# Patient Record
Sex: FEMALE | Race: WHITE | NOT HISPANIC OR LATINO | Employment: FULL TIME | URBAN - METROPOLITAN AREA
[De-identification: names, ages, dates, MRNs, and addresses within clinical notes are randomized per-mention and may not be internally consistent; named-entity substitution may affect disease eponyms.]

---

## 2017-12-23 ENCOUNTER — TRANSFERRED RECORDS (OUTPATIENT)
Dept: HEALTH INFORMATION MANAGEMENT | Facility: CLINIC | Age: 37
End: 2017-12-23

## 2018-12-20 ENCOUNTER — HOSPITAL ENCOUNTER (EMERGENCY)
Facility: CLINIC | Age: 38
Discharge: HOME | End: 2018-12-20
Attending: PHYSICIAN ASSISTANT | Admitting: PHYSICIAN ASSISTANT
Payer: MEDICARE

## 2018-12-20 ENCOUNTER — APPOINTMENT (OUTPATIENT)
Dept: GENERAL RADIOLOGY | Facility: CLINIC | Age: 38
End: 2018-12-20
Attending: PHYSICIAN ASSISTANT
Payer: MEDICARE

## 2018-12-20 VITALS
SYSTOLIC BLOOD PRESSURE: 162 MMHG | HEIGHT: 64 IN | OXYGEN SATURATION: 98 % | TEMPERATURE: 98.6 F | BODY MASS INDEX: 48.32 KG/M2 | HEART RATE: 71 BPM | DIASTOLIC BLOOD PRESSURE: 91 MMHG | WEIGHT: 283 LBS | RESPIRATION RATE: 18 BRPM

## 2018-12-20 DIAGNOSIS — M25.561 RIGHT KNEE PAIN: ICD-10-CM
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
## 2018-12-20 DIAGNOSIS — M25.532 LEFT WRIST PAIN: ICD-10-CM

## 2018-12-20 PROCEDURE — 29125 APPL SHORT ARM SPLINT STATIC: CPT | Mod: LT | Performed by: PHYSICIAN ASSISTANT

## 2018-12-20 PROCEDURE — 99214 OFFICE O/P EST MOD 30 MIN: CPT | Mod: Z6 | Performed by: PHYSICIAN ASSISTANT

## 2018-12-20 PROCEDURE — 29505 APPLICATION LONG LEG SPLINT: CPT | Mod: RT | Performed by: PHYSICIAN ASSISTANT

## 2018-12-20 PROCEDURE — G0463 HOSPITAL OUTPT CLINIC VISIT: HCPCS | Mod: 25 | Performed by: PHYSICIAN ASSISTANT

## 2018-12-20 PROCEDURE — A9270 NON-COVERED ITEM OR SERVICE: HCPCS | Mod: GY | Performed by: PHYSICIAN ASSISTANT

## 2018-12-20 PROCEDURE — 73560 X-RAY EXAM OF KNEE 1 OR 2: CPT | Mod: 50

## 2018-12-20 PROCEDURE — 25000132 ZZH RX MED GY IP 250 OP 250 PS 637: Performed by: PHYSICIAN ASSISTANT

## 2018-12-20 PROCEDURE — 73110 X-RAY EXAM OF WRIST: CPT | Mod: LT

## 2018-12-20 PROCEDURE — 72100 X-RAY EXAM L-S SPINE 2/3 VWS: CPT

## 2018-12-20 RX ORDER — METHOCARBAMOL 500 MG/1
500 TABLET, FILM COATED ORAL 4 TIMES DAILY PRN
Qty: 20 TABLET | Refills: 0 | Status: SHIPPED | OUTPATIENT
Start: 2018-12-20 | End: 2020-12-01

## 2018-12-20 RX ORDER — OXYCODONE AND ACETAMINOPHEN 5; 325 MG/1; MG/1
2 TABLET ORAL ONCE
Status: COMPLETED | OUTPATIENT
Start: 2018-12-20 | End: 2018-12-20

## 2018-12-20 RX ORDER — HYDROCODONE BITARTRATE AND ACETAMINOPHEN 5; 325 MG/1; MG/1
1 TABLET ORAL EVERY 6 HOURS PRN
Qty: 15 TABLET | Refills: 0 | Status: SHIPPED | OUTPATIENT
Start: 2018-12-20 | End: 2018-12-23

## 2018-12-20 RX ADMIN — OXYCODONE HYDROCHLORIDE AND ACETAMINOPHEN 2 TABLET: 5; 325 TABLET ORAL at 19:10

## 2018-12-20 ASSESSMENT — MIFFLIN-ST. JEOR: SCORE: 1934.71

## 2018-12-20 NOTE — ED PROVIDER NOTES
History     Chief Complaint   Patient presents with     Fall     pt slipped on her sidewalk this a.m.  c/o R knee pain and L wrist pain.  no LOC. no blood thinners     HPI  Alma Quinn is a 38 year old female past medical history significant for chronic low back pain, status post surgery with residual lumbar radiculopathy in the left leg, bipolar disorder, fibromyalgia who presents to the urgent care with concerns over several areas of pain after sustaining a fall earlier this morning.  Patient states that she typically uses a cane to ambulate during the winter months as she is a fall risk due to her chronic back pain.  She left the house this morning while wearing slipperst to grab something outside and slipped on the ice landing on to her wrists and knees bilaterally with knees in sight valgus position.  She was able to crawl and eventually arise on her own.  She complains of ecchymosis on her left knee and left wrist, however area of most pain is her right knee.  She states concern that knee feels like it is going to give out on her and she has had difficulty ambulating due to discomfort.  She denies hitting her head during the fall no significant headache, dizziness, lightheadedness, nausea, vomiting, abdominal or chest pain.  She has attempted to treat with ice, OTC medications without relief.      Problem List:    Patient Active Problem List    Diagnosis Date Noted     Bipolar disorder (H) 04/13/2011     Priority: [facility] psychiatric nurse, Margaux Azul, NP with [facility]       GERD (gastroesophageal reflux disease) 10/19/2012     Priority: Medium     Lumbar radiculopathy 11/04/2011     Priority: Medium     Acute bilateral leg weakness 2/11 and found to have L5-S1 herniated disk with compression of left L5 nerve root.    S/p left L5-S1 hemilaminectomy and microdiscectomy 2/24/11.  Continues to have residual left leg weakness and pain       Weakness of left leg 11/04/2011      Priority: Medium     Fibromyalgia 05/23/2011     Priority: Medium     DDD (degenerative disc disease), lumbar 04/13/2011     Priority: Medium     MRI 2/11- L5-S1 large central posterior extruded disc herniation extending inferior to the disc level, causing slight impression on  thecal sac and left S1 nerve root sleeve. S/p Left L5-S1 hemilaminectomy discectomy.        SAMEER (obstructive sleep apnea) 06/17/2010     Priority: Medium     Not tolerating CPAP, has deviated septum - referred to ENT in 12/09 and prescribed flonase       Seasonal allergic rhinitis 06/17/2010     Priority: Medium     Intermittent asthma 06/17/2010     Priority: Medium     Uses inhaled steroid in fall, typically       Chronic pain disorder 06/17/2010     Priority: Medium     Started in 2008 3 months postpartum.  Pain in her entire body with muscle cramping- arms, legs, elbows, knees, ankles, hips, shoulders, fingers etc.  Has been on multiple meds icluding gabapentin, cymbalta, NSAIDs, flexeril and narcotics with minimal benefit.  High doses of prednisone helped some.  Insurance would not cover lyrica.  Mild elevation of ESR to ~30 and CRP to ~24.  RF was 14 (normal 0-14).  CCP was negative  MRI 4/09:  Degenerative changes at L5-S1, mod to mild corby foraminal narrowing with mild facet arthropathy, no nerve impingement  Negative ehrlichia and lyme testing in 5/09  Mildly decreased vitamin D level at 20 in 10/08 - corrected to 25 in 1/09, then put on 50,000 units/week  Vitamin D level 5/10 was 16  ? Fibromyalgia vs chronic pain syndrome vs vitamin D deficiency?  Normal hand and foot xrays 7/09  Seen by Dr. Gildardo Villeda, rheumatology 6/10 - lab w/u and exam negative for RA.  Vitamin D levels low - recommended vitamin D3 4000 units/day x 8 weeks, then 2000 units/day.  Also referral to PT for iliotibial band syndrome and corby knee pain.  Also referral to rheumatology nursing associates for fibromyalgia.  Also emphasized need to treat SAMEER and  possible bariatric surgery.       PCOS (polycystic ovarian syndrome) 2010     Priority: Medium     CARDIOVASCULAR SCREENING; LDL GOAL LESS THAN 160 10/31/2010     Priority: Low      Past Medical History:    Past Medical History:   Diagnosis Date     Cystic kidney disease      Depression      PONV (postoperative nausea and vomiting)      Post traumatic stress disorder      Seasonal allergies      Toxemia      Uncomplicated asthma      Past Surgical History:    Past Surgical History:   Procedure Laterality Date     ADENOIDECTOMY       APPENDECTOMY       ARTHROSCOPY KNEE WITH MEDIAL MENISCECTOMY  2014    Procedure: ARTHROSCOPY KNEE WITH MEDIAL MENISCECTOMY;  Surgeon: Arron Curtis MD;  Location: [facility] OR     ARTHROSCOPY KNEE WITH MEDIAL MENISCECTOMY Left 2015    Procedure: ARTHROSCOPY KNEE WITH MEDIAL MENISCECTOMY;  Surgeon: Arron Curtis MD;  Location: [facility] OR     C/SECTION, CLASSICAL      , Classical     CHOLECYSTECTOMY, LAPOROSCOPIC  10/07    Cholecystectomy, Laparoscopic     HEMILAMINECTOMY, DISCECTOMY LUMBAR TWO LEVELS, COMBINED  11    Left L5-S1 hemilaminectomy for discectomy      LAPAROSCOPIC GASTRIC SLEEVE  12    lap sleeve gastrectomy     SURGICAL HISTORY OF -       Kidney Surgery     SURGICAL HISTORY OF -       Urethral implants     TONSILLECTOMY       Family History:    Family History   Problem Relation Age of Onset     Gastrointestinal Disease Mother         chrohns     Diabetes Father      Cancer Father         had cancer  between lining of lungs     Cardiovascular Father 57        4 valves replaced     Lupus Father      Breast Cancer Maternal Grandmother      Respiratory Maternal Grandfather         COPD     Diabetes Paternal Grandmother      C.A.D. Paternal Grandmother      Asthma Paternal Grandmother      Diabetes Paternal Grandfather      Prostate Cancer Paternal Grandfather      Asthma Son      Asthma Daughter      Social History:  Marital Status:   " [2]  Social History     Tobacco Use     Smoking status: Former Smoker     Packs/day: 0.50     Years: 15.00     Pack years: 7.50     Types: Cigarettes     Last attempt to quit: 2014     Years since quittin.4     Smokeless tobacco: Never Used     Tobacco comment: 1/4 pack daily   Substance Use Topics     Alcohol use: No     Drug use: No     Medications:      albuterol (PROAIR HFA, PROVENTIL HFA, VENTOLIN HFA) 108 (90 BASE) MCG/ACT inhaler   budesonide (PULMICORT) 0.25 MG/2ML nebulizer solution   budesonide (PULMICORT) 1 MG/2ML SUSP nebulizer solution   buPROPion (WELLBUTRIN XL) 150 MG 24 hr tablet   Cholecalciferol (VITAMIN D3) 64690 UNIT CAPS   folic acid (FOLVITE) 1 MG tablet   HYDROXYZINE HCL PO   levonorgestrel (MIRENA) 20 MCG/24HR IUD   metaxalone (SKELAXIN) 800 MG tablet   methotrexate 2.5 MG tablet   methylphenidate (CONCERTA) 27 MG CR tablet   Omega-3 Fatty Acids (OMEGA-3 FISH OIL PO)   omeprazole (PRILOSEC) 20 MG capsule   ondansetron (ZOFRAN) 4 MG tablet   ORDER FOR DME     Review of Systems  CONSTITUTIONAL:NEGATIVE for fever, chills, change in weight  INTEGUMENTARY/SKIN: POSITIVE for ecchymosis, abrasions NEGATIVE for lacerations, worrisome rashes   EYES: NEGATIVE for vision changes or irritation  ENT/MOUTH: NEGATIVE for ear, mouth and throat problems  RESP:NEGATIVE for significant cough or SOB  GI: NEGATIVE for abdominal pain, diarrhea, nausea and vomiting  MUSCULOSKELETAL: POSITIVE  for bilateral knee and wrist pain and low back pain  NEURO: POSITIVE for chronic change in sensation in her left leg and NEGATIVE for headache, dizziness, lightheadedness   Physical Exam   BP: (!) 162/91  Pulse: 71  Temp: 98.6  F (37  C)  Resp: 18  Height: 161.9 cm (5' 3.75\")  Weight: 128.4 kg (283 lb)  SpO2: 98 %  Physical Exam   Constitutional: She appears well-developed and well-nourished. No distress.   Cardiovascular: Normal rate. Exam reveals no gallop and no friction rub.   No murmur " heard.  Pulmonary/Chest: Effort normal. No respiratory distress. She has no wheezes. She has no rales. She exhibits no tenderness.   Musculoskeletal:        Right elbow: Normal.       Left elbow: Normal.        Right wrist: She exhibits tenderness. She exhibits normal range of motion, no bony tenderness, no swelling, no effusion, no crepitus, no deformity and no laceration.        Left wrist: She exhibits tenderness and swelling. She exhibits normal range of motion, no bony tenderness, no effusion, no crepitus, no deformity and no laceration.        Right knee: She exhibits decreased range of motion and swelling. She exhibits no effusion, no deformity, no laceration, no erythema, no LCL laxity and no MCL laxity. Tenderness found. MCL tenderness noted.        Left knee: She exhibits decreased range of motion. She exhibits no swelling, no effusion, no ecchymosis, no laceration, no LCL laxity and no MCL laxity. Tenderness found.        Lumbar back: She exhibits tenderness and pain. She exhibits no bony tenderness, no swelling, no edema, no laceration, no spasm and normal pulse.        Right hand: Normal.        Left hand: Normal.        Right lower leg: Normal.        Left lower leg: Normal.   Neurological: She is alert. She is not disoriented. No sensory deficit.   Sensation to light touch is grossly intact in upper and lower extremities bilaterally   Skin: Skin is warm. Abrasion and ecchymosis (dorsum of left wrist) noted. No rash noted. No erythema.       ED Course        Procedures        Critical Care time:  none        Results for orders placed or performed during the hospital encounter of 12/20/18   XR Wrist Left G/E 3 Views    Narrative    LEFT WRIST THREE OR MORE VIEWS 12/20/2018 5:48 PM     HISTORY: Pain after fall.    COMPARISON: None.      Impression    IMPRESSION: There is slight irregularity of the radial metaphysis  along the radial/lateral aspect. There is slight irregularity of the  ulnar styloid  process. These findings probably represent sequelae of  old injury; however, acute fracture cannot be completely excluded.  Consider follow-up x-rays. Carpal bones are unremarkable.    SHAY GALLAGHER MD   Lumbar spine XR, 2-3 views    Narrative    LUMBAR SPINE TWO TO THREE VIEWS   12/20/2018 5:48 PM     HISTORY: Pain after fall today, prior history of fusion.    COMPARISON: X-ray 2/10/2015    FINDINGS: Postoperative changes of L5-S1 posterior  fusion/decompression are demonstrated. No evidence of hardware  loosening. There is slight anterolisthesis of L5 on S1 (3 to 4 mm),  unchanged. No evidence of acute fracture or traumatic subluxation.  Vertebral body heights are maintained. Multilevel mild disc height  loss is present. Cholecystectomy clips noted. Intrauterine device is  present within the midline pelvis.      Impression    IMPRESSION: Stable alignment. No evidence of fracture.    SHAY GALLAGHER MD   XR Knee Bilateral 1/2 Views    Narrative    BILATERAL KNEES ONE TO TWO VIEWS  12/20/2018 5:47 PM     HISTORY: Pain after fall.    COMPARISON: 4/23/2014 left knee      Impression    IMPRESSION: No fracture or dislocation is identified. There is mild  medial compartment joint space narrowing, left worse than right. No  evidence of effusion.    SHAY GALLAGHER MD     Medications - No data to display     Assessments & Plan (with Medical Decision Making)     I have reviewed the nursing notes.    I have reviewed the findings, diagnosis, plan and need for follow up with the patient.          Medication List      Started    HYDROcodone-acetaminophen 5-325 MG tablet  Commonly known as:  NORCO  1 tablet, Oral, EVERY 6 HOURS PRN     methocarbamol 500 MG tablet  Commonly known as:  ROBAXIN  500 mg, Oral, 4 TIMES DAILY PRN            Final diagnoses:   Right knee pain   Fall, initial encounter   Left wrist pain     -year-old female with past medical history significant for chronic low back pain with left lumbar radiculopathy  presents to the urgent care with concern over her bilateral knee, wrist pain after sustaining fall on the ice earlier today.  She had stable vital signs upon arrival.  Physical exam findings as described above.  As part of evaluation patient had x-ray of her left wrist, lumbar spine and both knees which were negative for dislocation, some chronic degenerative changes seen and postoperative changes in her back were noted which were unchanged from her prior visits.  There was some concern over possible fracture to ulnar styloid, however age was indoctrinate on X-ray and as patient has less severe pain in this area, I have low suspicion for acute injury,  However shew as placed in Velcro wrist brace for pain.  Differential would include sprains contusion.  Patient's left knee is concerning for medial collateral ligament strain as patient has acute tenderness palpation over this area, difficult to assess for actual ligamentous stability due to patient's degree of pain and cooperation level.  She was placed in knee immobilizer for comfort.  Shew as discharged home stable with small number of Norco to be used as needed for breakthrough pain.  Follow up with her PCP or orthopedist for recheck in the next week.  Worrisome reasons to return to ER/UC sooner discussed.     Disclaimer: This note consists of symbols derived from keyboarding, dictation, and/or voice recognition software. As a result, there may be errors in the script that have gone undetected.  Please consider this when interpreting information found in the chart.      12/20/2018   [facility] EMERGENCY DEPARTMENT     Ally Lam PA-C  12/23/18 [id number]

## 2018-12-20 NOTE — ED AVS SNAPSHOT
[facility] Emergency Department  [address] [address]  Phone:  [phone]  Fax:  [phone]                                    Alma Quinn   MRN: [id number]    Department:  [facility] Emergency Department   Date of Visit:  12/20/2018           After Visit Summary Signature Page    I have received my discharge instructions, and my questions have been answered. I have discussed any challenges I see with this plan with the nurse or doctor.    ..........................................................................................................................................  Patient/Patient Representative Signature      ..........................................................................................................................................  Patient Representative Print Name and Relationship to Patient    ..................................................               ................................................  Date                                   Time    ..........................................................................................................................................  Reviewed by Signature/Title    ...................................................              ..............................................  Date                                               Time          22EPIC Rev 08/18

## 2020-01-24 ENCOUNTER — OFFICE VISIT (OUTPATIENT)
Dept: FAMILY MEDICINE | Facility: CLINIC | Age: 40
End: 2020-01-24
Payer: COMMERCIAL

## 2020-01-24 VITALS
HEART RATE: 70 BPM | BODY MASS INDEX: 48.82 KG/M2 | HEIGHT: 65 IN | TEMPERATURE: 98.7 F | DIASTOLIC BLOOD PRESSURE: 78 MMHG | RESPIRATION RATE: 16 BRPM | SYSTOLIC BLOOD PRESSURE: 112 MMHG | WEIGHT: 293 LBS | OXYGEN SATURATION: 96 %

## 2020-01-24 DIAGNOSIS — E66.01 MORBID OBESITY (H): ICD-10-CM

## 2020-01-24 DIAGNOSIS — J45.20 INTERMITTENT ASTHMA, UNSPECIFIED ASTHMA SEVERITY, UNSPECIFIED WHETHER COMPLICATED: ICD-10-CM

## 2020-01-24 DIAGNOSIS — R41.840 INATTENTION: ICD-10-CM

## 2020-01-24 DIAGNOSIS — F90.0 ADHD (ATTENTION DEFICIT HYPERACTIVITY DISORDER), INATTENTIVE TYPE: ICD-10-CM

## 2020-01-24 DIAGNOSIS — F31.9 BIPOLAR 1 DISORDER (H): ICD-10-CM

## 2020-01-24 DIAGNOSIS — G47.00 INSOMNIA, UNSPECIFIED TYPE: Primary | ICD-10-CM

## 2020-01-24 PROCEDURE — 99204 OFFICE O/P NEW MOD 45 MIN: CPT | Performed by: FAMILY MEDICINE

## 2020-01-24 RX ORDER — HYDROXYZINE HYDROCHLORIDE 50 MG/1
100 TABLET, FILM COATED ORAL
Qty: 180 TABLET | Refills: 4 | Status: SHIPPED | OUTPATIENT
Start: 2020-01-24 | End: 2021-04-08

## 2020-01-24 RX ORDER — ALBUTEROL SULFATE 90 UG/1
2 AEROSOL, METERED RESPIRATORY (INHALATION) EVERY 4 HOURS PRN
Qty: 1 INHALER | Refills: 4 | Status: SHIPPED | OUTPATIENT
Start: 2020-01-24 | End: 2021-08-06

## 2020-01-24 RX ORDER — MULTIPLE VITAMINS W/ MINERALS TAB 9MG-400MCG
1 TAB ORAL DAILY
COMMUNITY

## 2020-01-24 ASSESSMENT — PAIN SCALES - GENERAL: PAINLEVEL: NO PAIN (0)

## 2020-01-24 ASSESSMENT — ANXIETY QUESTIONNAIRES
GAD7 TOTAL SCORE: 9
5. BEING SO RESTLESS THAT IT IS HARD TO SIT STILL: MORE THAN HALF THE DAYS
1. FEELING NERVOUS, ANXIOUS, OR ON EDGE: NEARLY EVERY DAY
7. FEELING AFRAID AS IF SOMETHING AWFUL MIGHT HAPPEN: SEVERAL DAYS
2. NOT BEING ABLE TO STOP OR CONTROL WORRYING: NOT AT ALL
3. WORRYING TOO MUCH ABOUT DIFFERENT THINGS: SEVERAL DAYS
6. BECOMING EASILY ANNOYED OR IRRITABLE: NOT AT ALL

## 2020-01-24 ASSESSMENT — ASTHMA QUESTIONNAIRES
QUESTION_2 LAST FOUR WEEKS HOW OFTEN HAVE YOU HAD SHORTNESS OF BREATH: ONCE OR TWICE A WEEK
QUESTION_3 LAST FOUR WEEKS HOW OFTEN DID YOUR ASTHMA SYMPTOMS (WHEEZING, COUGHING, SHORTNESS OF BREATH, CHEST TIGHTNESS OR PAIN) WAKE YOU UP AT NIGHT OR EARLIER THAN USUAL IN THE MORNING: ONCE OR TWICE
ACT_TOTALSCORE: 20
QUESTION_4 LAST FOUR WEEKS HOW OFTEN HAVE YOU USED YOUR RESCUE INHALER OR NEBULIZER MEDICATION (SUCH AS ALBUTEROL): TWO OR THREE TIMES PER WEEK
QUESTION_1 LAST FOUR WEEKS HOW MUCH OF THE TIME DID YOUR ASTHMA KEEP YOU FROM GETTING AS MUCH DONE AT WORK, SCHOOL OR AT HOME: NONE OF THE TIME
QUESTION_5 LAST FOUR WEEKS HOW WOULD YOU RATE YOUR ASTHMA CONTROL: WELL CONTROLLED

## 2020-01-24 ASSESSMENT — PATIENT HEALTH QUESTIONNAIRE - PHQ9
5. POOR APPETITE OR OVEREATING: MORE THAN HALF THE DAYS
SUM OF ALL RESPONSES TO PHQ QUESTIONS 1-9: 2

## 2020-01-24 ASSESSMENT — MIFFLIN-ST. JEOR: SCORE: 2074.75

## 2020-01-24 NOTE — PROGRESS NOTES
Chief Complaint   Patient presents with     Sleep Problem     rx refill on Hydroxyzine.  Switching care to from [facility] [place].      ADDITIONAL HPI: 39 year old female here for above issue.  Flight phobia. Will likely need lorazepam or similar when she travels. Has used this with success in the past.    ROS: 10 point review of systems negative except as per HPI.    PAST MEDICAL HISTORY:  Past Medical History:   Diagnosis Date     Cystic kidney disease      Depression      PONV (postoperative nausea and vomiting)      Post traumatic stress disorder      Seasonal allergies      Toxemia      Uncomplicated asthma         ACTIVE MEDICAL PROBLEMS:  Patient Active Problem List   Diagnosis     SAMEER (obstructive sleep apnea)     Seasonal allergic rhinitis     Intermittent asthma     Chronic pain disorder     PCOS (polycystic ovarian syndrome)     CARDIOVASCULAR SCREENING; LDL GOAL LESS THAN 160     Bipolar disorder (H)     DDD (degenerative disc disease), lumbar     Fibromyalgia     Lumbar radiculopathy     Weakness of left leg     GERD (gastroesophageal reflux disease)        FAMILY HISTORY:  Family History   Problem Relation Age of Onset     Gastrointestinal Disease Mother         chrohns     Diabetes Father      Cancer Father         had cancer  between lining of lungs     Cardiovascular Father 57        4 valves replaced     Lupus Father      Breast Cancer Maternal Grandmother      Respiratory Maternal Grandfather         COPD     Diabetes Paternal Grandmother      C.A.D. Paternal Grandmother      Asthma Paternal Grandmother      Diabetes Paternal Grandfather      Prostate Cancer Paternal Grandfather      Asthma Son      Asthma Daughter        SOCIAL HISTORY:  Social History     Socioeconomic History     Marital status:      Spouse name: Not on file     Number of children: Not on file     Years of education: Not on file     Highest education level: Not on file   Occupational History      Not on file   Social Needs     Financial resource strain: Not on file     Food insecurity:     Worry: Not on file     Inability: Not on file     Transportation needs:     Medical: Not on file     Non-medical: Not on file   Tobacco Use     Smoking status: Former Smoker     Packs/day: 0.50     Years: 15.00     Pack years: 7.50     Types: Cigarettes     Last attempt to quit: 2014     Years since quittin.5     Smokeless tobacco: Never Used     Tobacco comment: 1/4 pack daily   Substance and Sexual Activity     Alcohol use: No     Drug use: No     Sexual activity: Yes     Partners: Male     Birth control/protection: I.U.D.   Lifestyle     Physical activity:     Days per week: Not on file     Minutes per session: Not on file     Stress: Not on file   Relationships     Social connections:     Talks on phone: Not on file     Gets together: Not on file     Attends [employer] service: Not on file     Active member of club or organization: Not on file     Attends meetings of clubs or organizations: Not on file     Relationship status: Not on file     Intimate partner violence:     Fear of current or ex partner: Not on file     Emotionally abused: Not on file     Physically abused: Not on file     Forced sexual activity: Not on file   Other Topics Concern     Parent/sibling w/ CABG, MI or angioplasty before 65F 55M? Yes   Social History Narrative     Not on file       MEDICATIONS:  Current Outpatient Medications   Medication Sig Dispense Refill     albuterol (PROAIR HFA, PROVENTIL HFA, VENTOLIN HFA) 108 (90 BASE) MCG/ACT inhaler Inhale 2 puffs into the lungs every 4 hours as needed for shortness of breath / dyspnea 1 Inhaler 4     budesonide (PULMICORT) 0.25 MG/2ML nebulizer solution Take 2 mLs (0.25 mg) by nebulization daily For asthma 60 mL 12     budesonide (PULMICORT) 1 MG/2ML SUSP nebulizer solution Take 2 mLs by nebulization 2 times daily. 120 mL 12     buPROPion (WELLBUTRIN XL) 150 MG 24 hr tablet Take 1  tablet (150 mg) by mouth every morning 30 tablet 1     Cholecalciferol (VITAMIN D3) 34072 UNIT CAPS Take 2 capsules by mouth 2 times daily.       folic acid (FOLVITE) 1 MG tablet Take 1 tablet (1 mg) by mouth daily 100 tablet 3     HYDROXYZINE HCL PO Take 10 mg by mouth       methocarbamol (ROBAXIN) 500 MG tablet Take 1 tablet (500 mg) by mouth 4 times daily as needed for muscle spasms 20 tablet 0     multivitamin w/minerals (MULTI-VITAMIN) tablet Take 1 tablet by mouth daily       Omega-3 Fatty Acids (OMEGA-3 FISH OIL PO)        omeprazole (PRILOSEC) 20 MG capsule Take 1 capsule (20 mg) by mouth 2 times daily 180 capsule 3     ondansetron (ZOFRAN) 4 MG tablet Take 1 tablet (4 mg) by mouth every 8 hours as needed for nausea 30 tablet 0     ORDER FOR DME Equipment being ordered: AFO for foot drop 1 each 0     vitamin B complex with vitamin C (VITAMIN  B COMPLEX) tablet Take 1 tablet by mouth daily       levonorgestrel (MIRENA) 20 MCG/24HR IUD 1 each by Intrauterine route once for 1 dose. 1 each 0     metaxalone (SKELAXIN) 800 MG tablet Take 1 tablet (800 mg) by mouth 3 times daily as needed for moderate pain (Patient not taking: Reported on 1/24/2020) 90 tablet 1     methotrexate 2.5 MG tablet Take 6 tablets (15 mg) by mouth once a week (Patient not taking: Reported on 1/24/2020) 30 tablet 1     methylphenidate (CONCERTA) 27 MG CR tablet Take 27 mg by mouth every morning         ALLERGIES:     Allergies   Allergen Reactions     Adhesive Tape Other (See Comments)     Open sores.     Shellfish Allergy      Avocado Rash     Banana Rash     Kiwi, avocado     Latex Rash     Penicillins Rash     Plaquenil [Hydroxychloroquine] Rash     Sulfanilamide Rash       Problem list, Medication list, Allergies, and Medical/Social/Surgical histories reviewed in EPIC and updated as appropriate.    OBJECTIVE:                                                    VITALS: /78 (BP Location: Right arm, Cuff Size: Adult Large)   Pulse 70  "  Temp 98.7  F (37.1  C) (Tympanic)   Resp 16   Ht 1.651 m (5' 5\")   Wt 140.9 kg (310 lb 9.6 oz)   SpO2 96%   Breastfeeding No   BMI 51.69 kg/m   Body mass index is 51.69 kg/m .  GENERAL: Pleasant, well appearing female.  HEENT: PERRL, EOMI, oropharynx clear.  NECK: supple, no thyromegaly or thyroid masses, no lymphadenopathy.  CV: RRR, no murmurs, rubs or gallops.  LUNGS: Clear to auscultation bilaterally, normal effort.  ABDOMEN: Soft, non-distended, non-tender.  No hepatosplenomegaly or palpable masses.    SKIN: warm and dry without obvious rashes.   EXTREMITIES: No edema, normal pulses.   PSYCH: Alert and oriented x3, neatly dressed and well groomed, makes good eye contact, fluid speech - non-pressured, no psychomotor agitation or slowing, good memory, judgement and insight, no auditory or visual hallucinations, bright affect which is mood congruent. No suicidal ideation.     ASSESSMENT/PLAN:                                                    1. Insomnia, unspecified type  Well controlled. Refilled medication.     - hydrOXYzine (ATARAX) 50 MG tablet; Take 2 tablets (100 mg) by mouth nightly as needed for itching  Dispense: 180 tablet; Refill: 4    2. Morbid obesity (H)  Working on lifestyle changes.     3. ADHD (attention deficit hyperactivity disorder), inattentive type  Given age I would recommend formal assessment through psychologist.   Self reported concerta but no rx for this documented anywhere.    4. Inattention  - MENTAL HEALTH REFERRAL  - Adult; Assessments and Testing; ADHD; [facility]: [facility] [phone]; We will contact you to schedule the appointment or please call with any questions    5. Intermittent asthma, unspecified asthma severity, unspecified whether complicated  Stable. Refilled medication.    - albuterol (PROAIR HFA/PROVENTIL HFA/VENTOLIN HFA) 108 (90 Base) MCG/ACT inhaler; Inhale 2 puffs into the lungs every 4 hours as needed for shortness of breath / dyspnea  " Dispense: 1 Inhaler; Refill: 4    6. Bipolar I  Sees psychiatric nurse, Margaux Azul NP with [facility]

## 2020-01-24 NOTE — PATIENT INSTRUCTIONS
Our Clinic hours are:  Mondays    7:20 am - 7 pm  Tues -  Fri  7:20 am - 5 pm    Clinic Phone: [phone]    The clinic lab opens at 7:30 am Mon - Fri and appointments are required.    [facility]. [phone]  Monday  8 am - 7pm  Tues - Fri 8 am - 5:30 pm

## 2020-01-25 ASSESSMENT — ASTHMA QUESTIONNAIRES: ACT_TOTALSCORE: 20

## 2020-01-25 ASSESSMENT — ANXIETY QUESTIONNAIRES: GAD7 TOTAL SCORE: 9

## 2020-03-06 ENCOUNTER — OFFICE VISIT (OUTPATIENT)
Dept: FAMILY MEDICINE | Facility: CLINIC | Age: 40
End: 2020-03-06
Payer: COMMERCIAL

## 2020-03-06 VITALS
DIASTOLIC BLOOD PRESSURE: 78 MMHG | BODY MASS INDEX: 48.82 KG/M2 | SYSTOLIC BLOOD PRESSURE: 114 MMHG | TEMPERATURE: 97.7 F | OXYGEN SATURATION: 98 % | WEIGHT: 293 LBS | HEART RATE: 74 BPM | RESPIRATION RATE: 18 BRPM | HEIGHT: 65 IN

## 2020-03-06 DIAGNOSIS — Z91.013 SHELLFISH ALLERGY: Primary | ICD-10-CM

## 2020-03-06 DIAGNOSIS — F40.243 FEAR OF FLYING: ICD-10-CM

## 2020-03-06 PROCEDURE — 99213 OFFICE O/P EST LOW 20 MIN: CPT | Performed by: FAMILY MEDICINE

## 2020-03-06 RX ORDER — LORAZEPAM 0.5 MG/1
.5-1 TABLET ORAL EVERY 6 HOURS PRN
Qty: 4 TABLET | Refills: 0 | Status: SHIPPED | OUTPATIENT
Start: 2020-03-06 | End: 2022-07-01

## 2020-03-06 RX ORDER — EPINEPHRINE 0.3 MG/.3ML
0.3 INJECTION SUBCUTANEOUS PRN
Qty: 2 EACH | Refills: 0 | Status: SHIPPED | OUTPATIENT
Start: 2020-03-06

## 2020-03-06 ASSESSMENT — MIFFLIN-ST. JEOR: SCORE: 2089.6

## 2020-03-06 ASSESSMENT — PAIN SCALES - GENERAL: PAINLEVEL: MODERATE PAIN (5)

## 2020-03-06 NOTE — PROGRESS NOTES
"Subjective     Alma Quinn is a 39 year old female who presents to clinic today for the following health issues:    HPI   Chief Complaint   Patient presents with     Medication Request     Patient needs epi-pen for bees and shellfish     Medication Request     Patient is going to [place] and will need medication for flying.      Going to [facility] tomorrow.  Has a shellfish and bee allergy.  Needs an epinephrine pen in her name (has some from her dad but was told she had to have a prescription with HER name on it for travel).     Also gets very anxious about flying.  Has some coping skills, payton, etc to help but would like something she can take for the flight down and back to help with her panic about flying.               Reviewed and updated as needed this visit by Provider         Review of Systems   ROS COMP: Constitutional, HEENT, cardiovascular, pulmonary, gi and gu systems are negative, except as otherwise noted.      Objective    /78   Pulse 74   Temp 97.7  F (36.5  C) (Tympanic)   Resp 18   Ht 1.657 m (5' 5.25\")   Wt 142 kg (313 lb)   SpO2 98%   BMI 51.69 kg/m    Body mass index is 51.69 kg/m .  Physical Exam   GENERAL: healthy, alert and no distress  NECK: no adenopathy, no asymmetry, masses, or scars and thyroid normal to palpation  RESP: lungs clear to auscultation - no rales, rhonchi or wheezes  CV: regular rate and rhythm, normal S1 S2, no S3 or S4, no murmur, click or rub, no peripheral edema and peripheral pulses strong  ABDOMEN: soft, nontender, no hepatosplenomegaly, no masses and bowel sounds normal  MS: no gross musculoskeletal defects noted, no edema            Assessment & Plan     1. Shellfish allergy     - EPINEPHrine (ANY BX GENERIC EQUIV) 0.3 MG/0.3ML injection 2-pack; Inject 0.3 mLs (0.3 mg) into the muscle as needed for anaphylaxis  Dispense: 2 each; Refill: 0    2. Fear of flying     - LORazepam (ATIVAN) 0.5 MG tablet; Take 1-2 tablets (0.5-1 mg) by mouth every 6 hours " "as needed for anxiety  Dispense: 4 tablet; Refill: 0     BMI:   Estimated body mass index is 51.69 kg/m  as calculated from the following:    Height as of this encounter: 1.657 m (5' 5.25\").    Weight as of this encounter: 142 kg (313 lb).               No follow-ups on file.    Tomeka Leger MD  [facility]      "

## 2020-03-06 NOTE — PATIENT INSTRUCTIONS
Our Clinic hours are:  Mondays    7:20 am - 7 pm  Tues -  Fri  7:20 am - 5 pm    Clinic Phone: [phone]    The clinic lab opens at 7:30 am Mon - Fri and appointments are required.    [facility]. [phone]  Monday  8 am - 7pm  Tues - Fri 8 am - 5:30 pm

## 2020-03-07 ASSESSMENT — ASTHMA QUESTIONNAIRES: ACT_TOTALSCORE: 25

## 2020-03-15 ENCOUNTER — HOSPITAL ENCOUNTER (EMERGENCY)
Facility: CLINIC | Age: 40
Discharge: HOME | End: 2020-03-15
Attending: FAMILY MEDICINE | Admitting: FAMILY MEDICINE
Payer: COMMERCIAL

## 2020-03-15 VITALS
OXYGEN SATURATION: 97 % | WEIGHT: 293 LBS | HEIGHT: 65 IN | RESPIRATION RATE: 10 BRPM | HEART RATE: 60 BPM | TEMPERATURE: 98.3 F | BODY MASS INDEX: 48.82 KG/M2 | SYSTOLIC BLOOD PRESSURE: 140 MMHG | DIASTOLIC BLOOD PRESSURE: 85 MMHG

## 2020-03-15 DIAGNOSIS — F41.9 ANXIETY: ICD-10-CM

## 2020-03-15 DIAGNOSIS — R07.89 OTHER CHEST PAIN: ICD-10-CM

## 2020-03-15 DIAGNOSIS — K21.00 GASTROESOPHAGEAL REFLUX DISEASE WITH ESOPHAGITIS: ICD-10-CM

## 2020-03-15 LAB
ALBUMIN SERPL-MCNC: 3.4 G/DL (ref 3.4–5)
ALP SERPL-CCNC: 73 U/L (ref 40–150)
ALT SERPL W P-5'-P-CCNC: 27 U/L (ref 0–50)
ANION GAP SERPL CALCULATED.3IONS-SCNC: 6 MMOL/L (ref 3–14)
AST SERPL W P-5'-P-CCNC: 19 U/L (ref 0–45)
BASOPHILS # BLD AUTO: 0.1 10E9/L (ref 0–0.2)
BASOPHILS NFR BLD AUTO: 0.7 %
BILIRUB SERPL-MCNC: 0.3 MG/DL (ref 0.2–1.3)
BUN SERPL-MCNC: 15 MG/DL (ref 7–30)
CALCIUM SERPL-MCNC: 9.1 MG/DL (ref 8.5–10.1)
CHLORIDE SERPL-SCNC: 111 MMOL/L (ref 94–109)
CO2 SERPL-SCNC: 26 MMOL/L (ref 20–32)
CREAT SERPL-MCNC: 0.87 MG/DL (ref 0.52–1.04)
DIFFERENTIAL METHOD BLD: NORMAL
EOSINOPHIL # BLD AUTO: 0.5 10E9/L (ref 0–0.7)
EOSINOPHIL NFR BLD AUTO: 7.3 %
ERYTHROCYTE [DISTWIDTH] IN BLOOD BY AUTOMATED COUNT: 13.2 % (ref 10–15)
GFR SERPL CREATININE-BSD FRML MDRD: 82 ML/MIN/{1.73_M2}
GLUCOSE SERPL-MCNC: 90 MG/DL (ref 70–99)
HCT VFR BLD AUTO: 40 % (ref 35–47)
HGB BLD-MCNC: 13 G/DL (ref 11.7–15.7)
IMM GRANULOCYTES # BLD: 0 10E9/L (ref 0–0.4)
IMM GRANULOCYTES NFR BLD: 0.3 %
LYMPHOCYTES # BLD AUTO: 1.9 10E9/L (ref 0.8–5.3)
LYMPHOCYTES NFR BLD AUTO: 27.8 %
MCH RBC QN AUTO: 28 PG (ref 26.5–33)
MCHC RBC AUTO-ENTMCNC: 32.5 G/DL (ref 31.5–36.5)
MCV RBC AUTO: 86 FL (ref 78–100)
MONOCYTES # BLD AUTO: 0.7 10E9/L (ref 0–1.3)
MONOCYTES NFR BLD AUTO: 9.7 %
NEUTROPHILS # BLD AUTO: 3.8 10E9/L (ref 1.6–8.3)
NEUTROPHILS NFR BLD AUTO: 54.2 %
NRBC # BLD AUTO: 0 10*3/UL
NRBC BLD AUTO-RTO: 0 /100
PLATELET # BLD AUTO: 248 10E9/L (ref 150–450)
POTASSIUM SERPL-SCNC: 4.2 MMOL/L (ref 3.4–5.3)
PROT SERPL-MCNC: 6.7 G/DL (ref 6.8–8.8)
RBC # BLD AUTO: 4.65 10E12/L (ref 3.8–5.2)
SODIUM SERPL-SCNC: 143 MMOL/L (ref 133–144)
TROPONIN I SERPL-MCNC: <0.015 UG/L (ref 0–0.04)
WBC # BLD AUTO: 7 10E9/L (ref 4–11)

## 2020-03-15 PROCEDURE — 93010 ELECTROCARDIOGRAM REPORT: CPT | Mod: Z6 | Performed by: FAMILY MEDICINE

## 2020-03-15 PROCEDURE — 99284 EMERGENCY DEPT VISIT MOD MDM: CPT | Mod: 25 | Performed by: FAMILY MEDICINE

## 2020-03-15 PROCEDURE — 80053 COMPREHEN METABOLIC PANEL: CPT | Performed by: FAMILY MEDICINE

## 2020-03-15 PROCEDURE — 99291 CRITICAL CARE FIRST HOUR: CPT | Mod: 25

## 2020-03-15 PROCEDURE — 85025 COMPLETE CBC W/AUTO DIFF WBC: CPT | Performed by: FAMILY MEDICINE

## 2020-03-15 PROCEDURE — 84484 ASSAY OF TROPONIN QUANT: CPT | Performed by: FAMILY MEDICINE

## 2020-03-15 PROCEDURE — 93005 ELECTROCARDIOGRAM TRACING: CPT

## 2020-03-15 RX ORDER — SUCRALFATE 1 G/1
1 TABLET ORAL
Qty: 40 TABLET | Refills: 1 | Status: SHIPPED | OUTPATIENT
Start: 2020-03-15 | End: 2021-03-15

## 2020-03-15 ASSESSMENT — ENCOUNTER SYMPTOMS
CHILLS: 0
FEVER: 0
SHORTNESS OF BREATH: 1
MUSCULOSKELETAL NEGATIVE: 1
DIZZINESS: 0
PALPITATIONS: 0
SORE THROAT: 1
COUGH: 0
HEMATOLOGIC/LYMPHATIC NEGATIVE: 1
VOMITING: 0
WEAKNESS: 0
ABDOMINAL PAIN: 0
EYES NEGATIVE: 1

## 2020-03-15 ASSESSMENT — MIFFLIN-ST. JEOR: SCORE: 2026.67

## 2020-03-15 NOTE — DISCHARGE INSTRUCTIONS
Continue your present medication.    Take the prescribed antacid as directed.    Have follow-up with your primary doctor.  Consider additional GI work-up.    For worsening chest pain, reflux, breathing problems, fever or other concerning symptoms, return for reevaluation.

## 2020-03-15 NOTE — ED TRIAGE NOTES
substernal chest pain that radiates to the left jaw that started in the night, this pain is accompanied with shortness of breath

## 2020-03-15 NOTE — ED AVS SNAPSHOT
[facility] Emergency Department  [address] [address]  Phone:  [phone]  Fax:  [phone]                                    Alma Quinn   MRN: [id number]    Department:  [facility] Emergency Department   Date of Visit:  3/15/2020           After Visit Summary Signature Page    I have received my discharge instructions, and my questions have been answered. I have discussed any challenges I see with this plan with the nurse or doctor.    ..........................................................................................................................................  Patient/Patient Representative Signature      ..........................................................................................................................................  Patient Representative Print Name and Relationship to Patient    ..................................................               ................................................  Date                                   Time    ..........................................................................................................................................  Reviewed by Signature/Title    ...................................................              ..............................................  Date                                               Time          22EPIC Rev 08/18

## 2020-03-15 NOTE — ED NOTES
Pt presents to ED with complaints of central chest pain that started in the night last night. Laying on her right side makes the pain worse, belching makes the pain better. Pt describes the pain as sharp. Pain radiates to the left  Jaw. Pt notes she has some shortness of breath with this as well, worse when the pain hits. Pt has history of asthma and allergies. Pt states she and her  were recently down in [place] ([facility] On this trip pt had a flare up of allergies. Pt has ongoing runny nose from her allergies. Pt believes the pain to be reflux related, states she has a history of this, but her  was concerned because it radiated to her jaw.

## 2020-03-15 NOTE — ED PROVIDER NOTES
History     Chief Complaint   Patient presents with     Chest Pain     substernal chest pain that radiates to the left jaw that started in the night, this pain is accompanied with shortness of breath     HPI  Alma Quinn is a 39 year old female who presents with chest pain and S OB.      This patient returned on a flight from [place] 1 day ago and that evening developed symptoms of chest pain.  Because of the travel, she was initially placed in isolation but she does not have a fever or significant cough so she does not profile out as high risk for Covid-19.    Her history indicates that she has had difficulties following gastric sleeve surgery in 2011.  She has frequent into ingestion and marked reflux.  She feels that the trip to [place] may have aggravated these symptoms and also she was anxious while flying and with current times and it may have triggered off her underlying panic disorder.    The patient had a somewhat positional chest pain last night, worse when she was lying on her right side.  She had difficulty sleeping due to this.  She describes sharp chest pain intermittently which radiates toward her back.  Usually relieved by belching or taking Gas-X or by drinking milk or water..  These did not help much by today.  Therefore she was having intermittent sharp chest pain sometimes radiating to neck.  Her  was concerned and urged her to come in to be evaluated.    The patient does not have significant risks for ischemic cardiac disease.  She is not a diabetic.  Does not have hypertension or hyperlipidemia.  She does not smoke cigarettes.  Her family history is not strongly positive for CAD.    Allergies:  Allergies   Allergen Reactions     Adhesive Tape Other (See Comments)     Open sores.     Shellfish Allergy      Avocado Rash     Banana Rash     Kiwi, avocado     Latex Rash     Penicillins Rash     Plaquenil [Hydroxychloroquine] Rash     Sulfanilamide Rash       Problem List:    Patient  Active Problem List    Diagnosis Date Noted     Bipolar 1 disorder (H) 04/13/2011     Priority: High     Sees psychiatric nurse, Margaux Azul NP with [facility]       Morbid obesity (H) 01/24/2020     Priority: Medium     GERD (gastroesophageal reflux disease) 10/19/2012     Priority: Medium     S/P gastrectomy 07/11/2012     Priority: Medium     Overview:   Lap Sleeve Gastrectomy by Dr. Ephraim Blood from bariatric surgery. DOS 7/11/2012 at [facility] [place], [place]       Lumbar radiculopathy 11/04/2011     Priority: Medium     Acute bilateral leg weakness 2/11 and found to have L5-S1 herniated disk with compression of left L5 nerve root.    S/p left L5-S1 hemilaminectomy and microdiscectomy 2/24/11.  Continues to have residual left leg weakness and pain       Weakness of left leg 11/04/2011     Priority: Medium     Fibromyalgia 05/23/2011     Priority: Medium     DDD (degenerative disc disease), lumbar 04/13/2011     Priority: Medium     MRI 2/11- L5-S1 large central posterior extruded disc herniation extending inferior to the disc level, causing slight impression on  thecal sac and left S1 nerve root sleeve. S/p Left L5-S1 hemilaminectomy discectomy.        SAMEER (obstructive sleep apnea) 06/17/2010     Priority: Medium     Not tolerating CPAP, has deviated septum - referred to ENT in 12/09 and prescribed flonase       Seasonal allergic rhinitis 06/17/2010     Priority: Medium     Intermittent asthma 06/17/2010     Priority: Medium     Uses inhaled steroid in fall, typically       Chronic pain disorder 06/17/2010     Priority: Medium     Started in 2008 3 months postpartum.  Pain in her entire body with muscle cramping- arms, legs, elbows, knees, ankles, hips, shoulders, fingers etc.  Has been on multiple meds icluding gabapentin, cymbalta, NSAIDs, flexeril and narcotics with minimal benefit.  High doses of prednisone helped some.  Insurance would not cover lyrica.  Mild elevation  of ESR to ~30 and CRP to ~24.  RF was 14 (normal 0-14).  CCP was negative  MRI 4/09:  Degenerative changes at L5-S1, mod to mild corby foraminal narrowing with mild facet arthropathy, no nerve impingement  Negative ehrlichia and lyme testing in 5/09  Mildly decreased vitamin D level at 20 in 10/08 - corrected to 25 in 1/09, then put on 50,000 units/week  Vitamin D level 5/10 was 16  ? Fibromyalgia vs chronic pain syndrome vs vitamin D deficiency?  Normal hand and foot xrays 7/09  Seen by Dr. Gildardo Villeda, rheumatology 6/10 - lab w/u and exam negative for RA.  Vitamin D levels low - recommended vitamin D3 4000 units/day x 8 weeks, then 2000 units/day.  Also referral to PT for iliotibial band syndrome and corby knee pain.  Also referral to rheumatology nursing associates for fibromyalgia.  Also emphasized need to treat SAMEER and possible bariatric surgery.       PCOS (polycystic ovarian syndrome) 06/17/2010     Priority: Medium     Generalized osteoarthrosis, involving multiple sites 05/07/2010     Priority: Medium     Bipolar I disorder (H) 05/07/2010     Priority: Medium     Status post bariatric surgery 02/04/2009     Priority: Medium     Posttraumatic stress disorder 09/16/2008     Priority: Medium     CARDIOVASCULAR SCREENING; LDL GOAL LESS THAN 160 10/31/2010     Priority: Low        Past Medical History:    Past Medical History:   Diagnosis Date     Cystic kidney disease      Depression      PONV (postoperative nausea and vomiting)      Post traumatic stress disorder      Seasonal allergies      Toxemia      Uncomplicated asthma        Past Surgical History:    Past Surgical History:   Procedure Laterality Date     ADENOIDECTOMY       APPENDECTOMY       ARTHROSCOPY KNEE WITH MEDIAL MENISCECTOMY  5/29/2014    Procedure: ARTHROSCOPY KNEE WITH MEDIAL MENISCECTOMY;  Surgeon: Arron Curtis MD;  Location: [facility] OR     ARTHROSCOPY KNEE WITH MEDIAL MENISCECTOMY Left 6/11/2015    Procedure: ARTHROSCOPY KNEE WITH MEDIAL  MENISCECTOMY;  Surgeon: Arron Curtis MD;  Location: [facility] OR     C/SECTION, CLASSICAL      , Classical     CHOLECYSTECTOMY, LAPOROSCOPIC  10/07    Cholecystectomy, Laparoscopic     HEMILAMINECTOMY, DISCECTOMY LUMBAR TWO LEVELS, COMBINED  11    Left L5-S1 hemilaminectomy for discectomy      LAPAROSCOPIC GASTRIC SLEEVE  12    lap sleeve gastrectomy     SURGICAL HISTORY OF 1980    Kidney Surgery     SURGICAL HISTORY OF     Urethral implants     TONSILLECTOMY         Family History:    Family History   Problem Relation Age of Onset     Gastrointestinal Disease Mother         chrohns     Diabetes Father      Cancer Father         had cancer  between lining of lungs     Cardiovascular Father 57        4 valves replaced     Lupus Father      Breast Cancer Maternal Grandmother      Respiratory Maternal Grandfather         COPD     Diabetes Paternal Grandmother      C.A.D. Paternal Grandmother      Asthma Paternal Grandmother      Diabetes Paternal Grandfather      Prostate Cancer Paternal Grandfather      Asthma Son      Asthma Daughter        Social History:  Marital Status:   [2]  Social History     Tobacco Use     Smoking status: Former Smoker     Packs/day: 0.50     Years: 15.00     Pack years: 7.50     Types: Cigarettes     Last attempt to quit: 2014     Years since quittin.6     Smokeless tobacco: Never Used     Tobacco comment: 1/4 pack daily   Substance Use Topics     Alcohol use: No     Drug use: No        Medications:    sucralfate (CARAFATE) 1 GM tablet  albuterol (PROAIR HFA/PROVENTIL HFA/VENTOLIN HFA) 108 (90 Base) MCG/ACT inhaler  budesonide (PULMICORT) 0.25 MG/2ML nebulizer solution  buPROPion (WELLBUTRIN XL) 150 MG 24 hr tablet  Cholecalciferol (VITAMIN D3) 49668 UNIT CAPS  EPINEPHrine (ANY BX GENERIC EQUIV) 0.3 MG/0.3ML injection 2-pack  folic acid (FOLVITE) 1 MG tablet  hydrOXYzine (ATARAX) 50 MG tablet  levonorgestrel (MIRENA) 20 MCG/24HR IUD  LORazepam  "(ATIVAN) 0.5 MG tablet  methocarbamol (ROBAXIN) 500 MG tablet  multivitamin w/minerals (MULTI-VITAMIN) tablet  Omega-3 Fatty Acids (OMEGA-3 FISH OIL PO)  omeprazole (PRILOSEC) 20 MG capsule  ondansetron (ZOFRAN) 4 MG tablet  ORDER FOR DME  vitamin B complex with vitamin C (VITAMIN  B COMPLEX) tablet          Review of Systems   Constitutional: Negative for chills and fever.   HENT: Positive for sore throat. Negative for congestion.    Eyes: Negative.    Respiratory: Positive for shortness of breath. Negative for cough.    Cardiovascular: Positive for chest pain. Negative for palpitations.   Gastrointestinal: Negative for abdominal pain and vomiting.   Genitourinary: Negative.    Musculoskeletal: Negative.    Skin: Negative.    Neurological: Negative for dizziness and weakness.   Hematological: Negative.    Psychiatric/Behavioral:        Anxiety.       Physical Exam   BP: 126/89  Pulse: 69  Heart Rate: 70  Temp: 98.3  F (36.8  C)  Resp: 16  Height: 165.1 cm (5' 5\")  Weight: 136.1 kg (300 lb)  SpO2: 95 %      Physical Exam  Constitutional:       Comments: Large woman, NAD.   HENT:      Head: Normocephalic and atraumatic.   Eyes:      Pupils: Pupils are equal, round, and reactive to light.   Neck:      Thyroid: No thyromegaly.   Cardiovascular:      Rate and Rhythm: Normal rate and regular rhythm.      Heart sounds: No murmur. No friction rub.   Pulmonary:      Breath sounds: Normal breath sounds.   Chest:      Chest wall: No tenderness.   Abdominal:      Tenderness: There is no abdominal tenderness.   Musculoskeletal:      Right lower leg: She exhibits no tenderness. No edema.      Left lower leg: She exhibits no tenderness. Edema present.   Skin:     General: Skin is warm and dry.   Neurological:      General: No focal deficit present.   Psychiatric:         Mood and Affect: Mood is anxious.         ED Course        Procedures    EKG:  RSR, rate 70.  Axis nl, conduction nl.  ST segments and T waves nl.  No " significant Q. No ectopy.  EKG is WNL.   Stable since 10-20-14.                   Critical Care time:  none               Results for orders placed or performed during the hospital encounter of 03/15/20 (from the past 24 hour(s))   CBC with platelets differential   Result Value Ref Range    WBC 7.0 4.0 - 11.0 10e9/L    RBC Count 4.65 3.8 - 5.2 10e12/L    Hemoglobin 13.0 11.7 - 15.7 g/dL    Hematocrit 40.0 35.0 - 47.0 %    MCV 86 78 - 100 fl    MCH 28.0 26.5 - 33.0 pg    MCHC 32.5 31.5 - 36.5 g/dL    RDW 13.2 10.0 - 15.0 %    Platelet Count 248 150 - 450 10e9/L    Diff Method Automated Method     % Neutrophils 54.2 %    % Lymphocytes 27.8 %    % Monocytes 9.7 %    % Eosinophils 7.3 %    % Basophils 0.7 %    % Immature Granulocytes 0.3 %    Nucleated RBCs 0 0 /100    Absolute Neutrophil 3.8 1.6 - 8.3 10e9/L    Absolute Lymphocytes 1.9 0.8 - 5.3 10e9/L    Absolute Monocytes 0.7 0.0 - 1.3 10e9/L    Absolute Eosinophils 0.5 0.0 - 0.7 10e9/L    Absolute Basophils 0.1 0.0 - 0.2 10e9/L    Abs Immature Granulocytes 0.0 0 - 0.4 10e9/L    Absolute Nucleated RBC 0.0    Comprehensive metabolic panel   Result Value Ref Range    Sodium 143 133 - 144 mmol/L    Potassium 4.2 3.4 - 5.3 mmol/L    Chloride 111 (H) 94 - 109 mmol/L    Carbon Dioxide 26 20 - 32 mmol/L    Anion Gap 6 3 - 14 mmol/L    Glucose 90 70 - 99 mg/dL    Urea Nitrogen 15 7 - 30 mg/dL    Creatinine 0.87 0.52 - 1.04 mg/dL    GFR Estimate 82 >60 mL/min/[1.73_m2]    GFR Estimate If Black >90 >60 mL/min/[1.73_m2]    Calcium 9.1 8.5 - 10.1 mg/dL    Bilirubin Total 0.3 0.2 - 1.3 mg/dL    Albumin 3.4 3.4 - 5.0 g/dL    Protein Total 6.7 (L) 6.8 - 8.8 g/dL    Alkaline Phosphatase 73 40 - 150 U/L    ALT 27 0 - 50 U/L    AST 19 0 - 45 U/L   Troponin I   Result Value Ref Range    Troponin I ES <0.015 0.000 - 0.045 ug/L       Medications - No data to display    Assessments & Plan (with Medical Decision Making)     Please see the H&P for details.  This patient presented with chest  pain and the primary initial concern was to rule out heart disease.  She had traveled from [place].  She was having exacerbation of GERD symptoms.  Previous gastric surgeries noted.  She additionally had some anxiety.  She did not have significant risk factors for cardiovascular disease.  Lab work-up showed normal CBC and chemistries.  Troponin level was normal.  Electrocardiogram was normal.  Findings were discussed with patient and her .  She is already on omeprazole.  Carafate was added.  She was asked to have follow-up with primary care but also to consider additional GI work-up as her stomach seems to be bothering her quite a bit.  She is agreeable with these plans.            I have reviewed the nursing notes.    I have reviewed the findings, diagnosis, plan and need for follow up with the patient.       Discharge Medication List as of 3/15/2020 11:57 AM      START taking these medications    Details   sucralfate (CARAFATE) 1 GM tablet Take 1 tablet (1 g) by mouth 4 times daily (before meals and nightly) . Crush in small amount of water., Disp-40 tablet,R-1, E-Prescribe             Final diagnoses:   Other chest pain   Gastroesophageal reflux disease with esophagitis   Anxiety       3/15/2020   [facility] EMERGENCY DEPARTMENT     Perez Tineo MD  03/15/20 [id number]

## 2020-12-01 ENCOUNTER — VIRTUAL VISIT (OUTPATIENT)
Dept: FAMILY MEDICINE | Facility: CLINIC | Age: 40
End: 2020-12-01
Payer: COMMERCIAL

## 2020-12-01 DIAGNOSIS — M62.830 BACK MUSCLE SPASM: ICD-10-CM

## 2020-12-01 DIAGNOSIS — M19.029 INFLAMMATION OF ELBOW JOINT: Primary | ICD-10-CM

## 2020-12-01 DIAGNOSIS — M51.369 DDD (DEGENERATIVE DISC DISEASE), LUMBAR: ICD-10-CM

## 2020-12-01 PROCEDURE — 99214 OFFICE O/P EST MOD 30 MIN: CPT | Mod: 95 | Performed by: NURSE PRACTITIONER

## 2020-12-01 RX ORDER — PREDNISONE 20 MG/1
40 TABLET ORAL DAILY
Qty: 10 TABLET | Refills: 0 | Status: SHIPPED | OUTPATIENT
Start: 2020-12-01 | End: 2020-12-06

## 2020-12-01 RX ORDER — METHOCARBAMOL 500 MG/1
500 TABLET, FILM COATED ORAL 4 TIMES DAILY PRN
Qty: 20 TABLET | Refills: 0 | Status: SHIPPED | OUTPATIENT
Start: 2020-12-01 | End: 2021-08-06

## 2020-12-01 ASSESSMENT — ENCOUNTER SYMPTOMS
CONSTITUTIONAL NEGATIVE: 1
BACK PAIN: 1

## 2020-12-01 NOTE — PROGRESS NOTES
"Alma Quinn is a 40 year old female who is being evaluated via a billable telephone visit.      The patient has been notified of following:     \"This telephone visit will be conducted via a call between you and your physician/provider. We have found that certain health care needs can be provided without the need for a physical exam.  This service lets us provide the care you need with a short phone conversation.  If a prescription is necessary we can send it directly to your pharmacy.  If lab work is needed we can place an order for that and you can then stop by our lab to have the test done at a later time.    Telephone visits are billed at different rates depending on your insurance coverage. During this emergency period, for some insurers they may be billed the same as an in-person visit.  Please reach out to your insurance provider with any questions.    If during the course of the call the physician/provider feels a telephone visit is not appropriate, you will not be charged for this service.\"    Patient has given verbal consent for Telephone visit?  Yes    What phone number would you like to be contacted at? [phone]    How would you like to obtain your AVS? Pt. Doesn't need one.     Subjective     Alma Quinn is a 40 year old female who presents via phone visit today for the following health issues:    HPI     Chronic Pain Follow-Up    Where in your body do you have pain? Bilateral elbows left worse than right , hands   How has your pain affected your ability to work? Can work part time with limitations  Which of these pain treatments have you tried since your last clinic visit? Relaxation techniques / Biofeedback, Stretching and Other: banding, gives herself breaks chiropractor, massage and tens unit.   How well are you sleeping? Fair  How has your mood been since your last visit? About the same  Have you had a significant life event? No  Other aggravating factors: repetitive activities - typing at " work  Taking medication as directed? Yes    PHQ-9 SCORE 6/9/2016 1/24/2020   PHQ-9 Total Score 9 2     YO-7 SCORE 6/9/2016 1/24/2020   Total Score 12 9     No flowsheet data found.  Encounter-Level CSA:    There are no encounter-level csa.     Patient-Level CSA:    There are no patient-level csa.         How many servings of fruits and vegetables do you eat daily?  4 or more    On average, how many sweetened beverages do you drink each day (Examples: soda, juice, sweet tea, etc.  Do NOT count diet or artificially sweetened beverages)?   1    How many days per week do you exercise enough to make your heart beat faster? Very active at work    How many minutes a day do you exercise enough to make your heart beat faster? none    How many days per week do you miss taking your medication? 0    Additional provider notes: Left elbow pain worsening over the past 2 weeks. Left elbow pain also locking up at times. Left hand is weaker and is now dropping things. Finger movement is causing more elbow pain. Now with right elbow pain. Left elbow having constant redness and warmth where pain is. Couple nights ago it swelled up and made it hard . Aleve helps with relief but hurts stomach (acid reflux). No known injury. Chronic pain since daughter was born.     Back pain/spasm: took last robaxin 3 weeks ago. Requesting refill.     Review of Systems   Constitutional: Negative.    Musculoskeletal: Positive for back pain (and muscle spasms, chronic).        Left elbow pain, redness, swelling; now slight right elbow pain             Objective          Vitals:  No vitals were obtained today due to virtual visit.    healthy, alert, mild distress and cooperative  PSYCH: Alert and oriented times 3; coherent speech, normal   rate and volume, able to articulate logical thoughts, able   to abstract reason, no tangential thoughts, no hallucinations   or delusions  Her affect is normal and pleasant  RESP: No cough, no audible wheezing, able to  "talk in full sentences  Remainder of exam unable to be completed due to telephone visits            Assessment/Plan:    Assessment & Plan     Inflammation of elbow joint  Multiple possible dx. Patient has a history of inflammation/pain in joints. No hx of gout. After discussion with patient, prescribed prednisone assuming cause of issues are due to inflammation/irritation. Recommended follow-up in person if symptoms do not improve with prednisone. Will likely need x-ray. R/o possible infection, although doesn't sound like infection if effecting both elbows.    - predniSONE (DELTASONE) 20 MG tablet; Take 2 tablets (40 mg) by mouth daily for 5 days    DDD (degenerative disc disease), lumbar  Stable. Robaxin refilled.     - methocarbamol (ROBAXIN) 500 MG tablet; Take 1 tablet (500 mg) by mouth 4 times daily as needed for muscle spasms    Back muscle spasm    - methocarbamol (ROBAXIN) 500 MG tablet; Take 1 tablet (500 mg) by mouth 4 times daily as needed for muscle spasms     BMI:   Estimated body mass index is 49.92 kg/m  as calculated from the following:    Height as of 3/15/20: 1.651 m (5' 5\").    Weight as of 3/15/20: 136.1 kg (300 lb).            Patient Instructions   Robaxin refilled.     Take prednisone as prescribed. It can increase hunger, thirst, urination, and irritability while on it. Take in the morning to prevent insomnia.     If symptoms do not improve or worsen over the next week, please schedule in person visit for further evaluation.       Return if symptoms worsen or fail to improve.    DELANO [facility]    Phone call duration:  13 minutes              "

## 2021-01-08 ENCOUNTER — VIRTUAL VISIT (OUTPATIENT)
Dept: FAMILY MEDICINE | Facility: CLINIC | Age: 41
End: 2021-01-08
Payer: COMMERCIAL

## 2021-01-08 DIAGNOSIS — Z98.84 STATUS POST BARIATRIC SURGERY: ICD-10-CM

## 2021-01-08 DIAGNOSIS — M25.521 PAIN OF BOTH ELBOWS: ICD-10-CM

## 2021-01-08 DIAGNOSIS — K44.9 HIATAL HERNIA: ICD-10-CM

## 2021-01-08 DIAGNOSIS — M25.522 PAIN OF BOTH ELBOWS: ICD-10-CM

## 2021-01-08 DIAGNOSIS — K21.9 GASTROESOPHAGEAL REFLUX DISEASE, UNSPECIFIED WHETHER ESOPHAGITIS PRESENT: Primary | ICD-10-CM

## 2021-01-08 PROCEDURE — 99214 OFFICE O/P EST MOD 30 MIN: CPT | Mod: 95 | Performed by: FAMILY MEDICINE

## 2021-01-08 RX ORDER — OMEPRAZOLE 40 MG/1
40 CAPSULE, DELAYED RELEASE ORAL DAILY
Qty: 60 CAPSULE | Refills: 3 | Status: SHIPPED | OUTPATIENT
Start: 2021-01-08 | End: 2021-08-06

## 2021-01-08 ASSESSMENT — ASTHMA QUESTIONNAIRES
QUESTION_2 LAST FOUR WEEKS HOW OFTEN HAVE YOU HAD SHORTNESS OF BREATH: NOT AT ALL
QUESTION_4 LAST FOUR WEEKS HOW OFTEN HAVE YOU USED YOUR RESCUE INHALER OR NEBULIZER MEDICATION (SUCH AS ALBUTEROL): NOT AT ALL
ACT_TOTALSCORE: 25
QUESTION_3 LAST FOUR WEEKS HOW OFTEN DID YOUR ASTHMA SYMPTOMS (WHEEZING, COUGHING, SHORTNESS OF BREATH, CHEST TIGHTNESS OR PAIN) WAKE YOU UP AT NIGHT OR EARLIER THAN USUAL IN THE MORNING: NOT AT ALL
QUESTION_1 LAST FOUR WEEKS HOW MUCH OF THE TIME DID YOUR ASTHMA KEEP YOU FROM GETTING AS MUCH DONE AT WORK, SCHOOL OR AT HOME: NONE OF THE TIME
QUESTION_5 LAST FOUR WEEKS HOW WOULD YOU RATE YOUR ASTHMA CONTROL: COMPLETELY CONTROLLED

## 2021-01-08 NOTE — PATIENT INSTRUCTIONS
Our Clinic hours are:  Mondays    7:20 am - 7 pm  Tues -  Fri  7:20 am - 5 pm    Clinic Phone: [phone]    The clinic lab opens at 7:30 am Mon - Fri and appointments are required.    [facility]. [phone]  Monday  8 am - 7pm  Tues - Fri 8 am - 5:30 pm

## 2021-01-09 ASSESSMENT — ASTHMA QUESTIONNAIRES: ACT_TOTALSCORE: 25

## 2021-01-15 ENCOUNTER — HEALTH MAINTENANCE LETTER (OUTPATIENT)
Age: 41
End: 2021-01-15

## 2021-03-15 ENCOUNTER — OFFICE VISIT (OUTPATIENT)
Dept: FAMILY MEDICINE | Facility: CLINIC | Age: 41
End: 2021-03-15
Payer: COMMERCIAL

## 2021-03-15 VITALS
TEMPERATURE: 98 F | RESPIRATION RATE: 16 BRPM | OXYGEN SATURATION: 97 % | HEART RATE: 80 BPM | HEIGHT: 64 IN | BODY MASS INDEX: 50.02 KG/M2 | SYSTOLIC BLOOD PRESSURE: 136 MMHG | DIASTOLIC BLOOD PRESSURE: 80 MMHG | WEIGHT: 293 LBS

## 2021-03-15 DIAGNOSIS — G89.29 OTHER CHRONIC PAIN: ICD-10-CM

## 2021-03-15 DIAGNOSIS — M25.521 BILATERAL ELBOW JOINT PAIN: Primary | ICD-10-CM

## 2021-03-15 DIAGNOSIS — F31.9 BIPOLAR AFFECTIVE DISORDER, REMISSION STATUS UNSPECIFIED (H): ICD-10-CM

## 2021-03-15 DIAGNOSIS — M25.522 BILATERAL ELBOW JOINT PAIN: Primary | ICD-10-CM

## 2021-03-15 LAB
ALBUMIN SERPL-MCNC: 3.2 G/DL (ref 3.4–5)
ALP SERPL-CCNC: 81 U/L (ref 40–150)
ALT SERPL W P-5'-P-CCNC: 27 U/L (ref 0–50)
ANION GAP SERPL CALCULATED.3IONS-SCNC: 3 MMOL/L (ref 3–14)
AST SERPL W P-5'-P-CCNC: 21 U/L (ref 0–45)
BASOPHILS # BLD AUTO: 0.1 10E9/L (ref 0–0.2)
BASOPHILS NFR BLD AUTO: 0.5 %
BILIRUB SERPL-MCNC: 0.2 MG/DL (ref 0.2–1.3)
BUN SERPL-MCNC: 18 MG/DL (ref 7–30)
CALCIUM SERPL-MCNC: 8.6 MG/DL (ref 8.5–10.1)
CHLORIDE SERPL-SCNC: 109 MMOL/L (ref 94–109)
CO2 SERPL-SCNC: 27 MMOL/L (ref 20–32)
CREAT SERPL-MCNC: 0.85 MG/DL (ref 0.52–1.04)
DIFFERENTIAL METHOD BLD: NORMAL
EOSINOPHIL # BLD AUTO: 0.5 10E9/L (ref 0–0.7)
EOSINOPHIL NFR BLD AUTO: 5.2 %
ERYTHROCYTE [DISTWIDTH] IN BLOOD BY AUTOMATED COUNT: 13.7 % (ref 10–15)
ERYTHROCYTE [SEDIMENTATION RATE] IN BLOOD BY WESTERGREN METHOD: 14 MM/H (ref 0–20)
GFR SERPL CREATININE-BSD FRML MDRD: 84 ML/MIN/{1.73_M2}
GLUCOSE SERPL-MCNC: 142 MG/DL (ref 70–99)
HCT VFR BLD AUTO: 39.3 % (ref 35–47)
HGB BLD-MCNC: 12.8 G/DL (ref 11.7–15.7)
LYMPHOCYTES # BLD AUTO: 1.8 10E9/L (ref 0.8–5.3)
LYMPHOCYTES NFR BLD AUTO: 19.9 %
MCH RBC QN AUTO: 28.1 PG (ref 26.5–33)
MCHC RBC AUTO-ENTMCNC: 32.6 G/DL (ref 31.5–36.5)
MCV RBC AUTO: 86 FL (ref 78–100)
MONOCYTES # BLD AUTO: 0.6 10E9/L (ref 0–1.3)
MONOCYTES NFR BLD AUTO: 7 %
NEUTROPHILS # BLD AUTO: 6.1 10E9/L (ref 1.6–8.3)
NEUTROPHILS NFR BLD AUTO: 67.4 %
PLATELET # BLD AUTO: 249 10E9/L (ref 150–450)
POTASSIUM SERPL-SCNC: 4.1 MMOL/L (ref 3.4–5.3)
PROT SERPL-MCNC: 6.5 G/DL (ref 6.8–8.8)
RBC # BLD AUTO: 4.55 10E12/L (ref 3.8–5.2)
SODIUM SERPL-SCNC: 139 MMOL/L (ref 133–144)
WBC # BLD AUTO: 9.1 10E9/L (ref 4–11)

## 2021-03-15 PROCEDURE — 86200 CCP ANTIBODY: CPT | Performed by: NURSE PRACTITIONER

## 2021-03-15 PROCEDURE — 80053 COMPREHEN METABOLIC PANEL: CPT | Performed by: NURSE PRACTITIONER

## 2021-03-15 PROCEDURE — 86038 ANTINUCLEAR ANTIBODIES: CPT | Performed by: NURSE PRACTITIONER

## 2021-03-15 PROCEDURE — 36415 COLL VENOUS BLD VENIPUNCTURE: CPT | Performed by: NURSE PRACTITIONER

## 2021-03-15 PROCEDURE — 85652 RBC SED RATE AUTOMATED: CPT | Performed by: NURSE PRACTITIONER

## 2021-03-15 PROCEDURE — 85025 COMPLETE CBC W/AUTO DIFF WBC: CPT | Performed by: NURSE PRACTITIONER

## 2021-03-15 PROCEDURE — 86431 RHEUMATOID FACTOR QUANT: CPT | Performed by: NURSE PRACTITIONER

## 2021-03-15 PROCEDURE — 99214 OFFICE O/P EST MOD 30 MIN: CPT | Performed by: NURSE PRACTITIONER

## 2021-03-15 RX ORDER — BUPROPION HYDROCHLORIDE 300 MG/1
300 TABLET ORAL EVERY MORNING
Qty: 90 TABLET | Refills: 0 | Status: SHIPPED | OUTPATIENT
Start: 2021-03-15 | End: 2021-08-06

## 2021-03-15 RX ORDER — PREDNISONE 20 MG/1
40 TABLET ORAL DAILY
Qty: 10 TABLET | Refills: 0 | Status: SHIPPED | OUTPATIENT
Start: 2021-03-15 | End: 2021-03-20

## 2021-03-15 RX ORDER — BUPROPION HYDROCHLORIDE 150 MG/1
150 TABLET ORAL EVERY MORNING
Qty: 30 TABLET | Refills: 1 | Status: CANCELLED | OUTPATIENT
Start: 2021-03-15

## 2021-03-15 ASSESSMENT — ENCOUNTER SYMPTOMS
JOINT SWELLING: 1
ABDOMINAL PAIN: 0
HEARTBURN: 1
CONSTITUTIONAL NEGATIVE: 1

## 2021-03-15 ASSESSMENT — ANXIETY QUESTIONNAIRES
6. BECOMING EASILY ANNOYED OR IRRITABLE: SEVERAL DAYS
3. WORRYING TOO MUCH ABOUT DIFFERENT THINGS: SEVERAL DAYS
7. FEELING AFRAID AS IF SOMETHING AWFUL MIGHT HAPPEN: NOT AT ALL
5. BEING SO RESTLESS THAT IT IS HARD TO SIT STILL: SEVERAL DAYS
2. NOT BEING ABLE TO STOP OR CONTROL WORRYING: NOT AT ALL
GAD7 TOTAL SCORE: 4
1. FEELING NERVOUS, ANXIOUS, OR ON EDGE: NOT AT ALL

## 2021-03-15 ASSESSMENT — MIFFLIN-ST. JEOR: SCORE: 2126.45

## 2021-03-15 ASSESSMENT — PATIENT HEALTH QUESTIONNAIRE - PHQ9
SUM OF ALL RESPONSES TO PHQ QUESTIONS 1-9: 3
5. POOR APPETITE OR OVEREATING: SEVERAL DAYS

## 2021-03-15 NOTE — PATIENT INSTRUCTIONS
Labs ordered. Will discuss rheumatology referral after labs result.     Take prednisone as prescribed. It can increase hunger, thirst, urination, and irritability while on it. Take in the morning to prevent insomnia.    Wellbutrin refilled. Follow-up with PCP for further refills.

## 2021-03-15 NOTE — PROGRESS NOTES
"    Assessment & Plan     Bilateral elbow joint pain  Chronic. Labs ordered. Prednisone prescribed as it helped in the past to provide short term relief. Side effects, risks and benefits of medication were discussed with patient. Discussed how and when to take medication. Based on labs, will likely placed ordered for rheumatology consult given her history.     - CBC with platelets and differential  - ESR: Erythrocyte sedimentation rate  - Rheumatoid factor  - Anti Nuclear Lilia IgG by IFA with Reflex  - Cyclic Citrullinated Peptide Antibody IgG  - Comprehensive metabolic panel (BMP + Alb, Alk Phos, ALT, AST, Total. Bili, TP)  - predniSONE (DELTASONE) 20 MG tablet; Take 2 tablets (40 mg) by mouth daily for 5 days    Bipolar affective disorder, remission status unspecified (H)  Refill prescribed. Bottle verified that dose is now 300mg and not 150mg.     - buPROPion (WELLBUTRIN XL) 300 MG 24 hr tablet; Take 1 tablet (300 mg) by mouth every morning    Other chronic pain    - CBC with platelets and differential  - ESR: Erythrocyte sedimentation rate  - Rheumatoid factor  - Anti Nuclear Lilia IgG by IFA with Reflex  - Cyclic Citrullinated Peptide Antibody IgG  - Comprehensive metabolic panel (BMP + Alb, Alk Phos, ALT, AST, Total. Bili, TP)             BMI:   Estimated body mass index is 56.06 kg/m  as calculated from the following:    Height as of this encounter: 1.626 m (5' 4\").    Weight as of this encounter: 148.1 kg (326 lb 9.6 oz).       Patient Instructions   Labs ordered. Will discuss rheumatology referral after labs result.     Take prednisone as prescribed. It can increase hunger, thirst, urination, and irritability while on it. Take in the morning to prevent insomnia.    Wellbutrin refilled. Follow-up with PCP for further refills.       Return if symptoms worsen or fail to improve.    DELANO Beauchamp CNP  [facility]    Sulaiman Thompson is a 40 year old who presents for the " following health issues;    HPI       Musculoskeletal problem/pain  Onset/Duration: Ongoing for months    Description  Location: elbows - bilateral  Joint Swelling: YES  Redness: YES- SOMETIMES   Pain: YES- 8/10  Warmth: no  Intensity:  moderate  Progression of Symptoms:  worsening and constant  Accompanying signs and symptoms:   Fevers: no  Numbness/tingling/weakness: YES- NUMBNESS, TINGLING, WEAKNESS  History  Trauma to the area: no  Recent illness:  no  Previous similar problem: no  Previous evaluation:  no  Precipitating or alleviating factors:  Aggravating factors include: overuse, Hard to dress herself, get out of bed, typing for work  Therapies tried and outcome: ice, stretching, compression, tens unit nothing has helped     Depression Followup    How are you doing with your depression since your last visit? Stable     Are you having other symptoms that might be associated with depression? No    Have you had a significant life event?  No     Are you feeling anxious or having panic attacks?   Yes:  Last panic atttack was 3 months ago     Do you have any concerns with your use of alcohol or other drugs? No    Social History     Tobacco Use     Smoking status: Former Smoker     Packs/day: 0.50     Years: 15.00     Pack years: 7.50     Types: Cigarettes     Quit date: 2014     Years since quittin.6     Smokeless tobacco: Never Used     Tobacco comment: 1/4 pack daily   Substance Use Topics     Alcohol use: No     Drug use: No     PHQ 2016 2020 3/15/2021   PHQ-9 Total Score 9 2 3   Q9: Thoughts of better off dead/self-harm past 2 weeks Not at all Not at all Not at all     YO-7 SCORE 2016 2020 3/15/2021   Total Score 12 9 4       Last PHQ-9 3/15/2021   1.  Little interest or pleasure in doing things 0   2.  Feeling down, depressed, or hopeless 0   3.  Trouble falling or staying asleep, or sleeping too much 1   4.  Feeling tired or having little energy 0   5.  Poor appetite or overeating 1  "  6.  Feeling bad about yourself 0   7.  Trouble concentrating 1   8.  Moving slowly or restless 0   Q9: Thoughts of better off dead/self-harm past 2 weeks 0   PHQ-9 Total Score 3     YO-7  3/15/2021   1. Feeling nervous, anxious, or on edge 0   2. Not being able to stop or control worrying 0   3. Worrying too much about different things 1   4. Trouble relaxing 1   5. Being so restless that it is hard to sit still 1   6. Becoming easily annoyed or irritable 1   7. Feeling afraid, as if something awful might happen 0   YO-7 Total Score 4         Suicide Assessment Five-step Evaluation and Treatment (SAFE-T)      How many servings of fruits and vegetables do you eat daily?  4 or more    On average, how many sweetened beverages do you drink each day (Examples: soda, juice, sweet tea, etc.  Do NOT count diet or artificially sweetened beverages)?   1    How many days per week do you exercise enough to make your heart beat faster? 3 or less    How many minutes a day do you exercise enough to make your heart beat faster? 30 - 60    How many days per week do you miss taking your medication? 0    **  This was being filled by Dr. Bhandari, [facility] in [place].      Additional provider notes: Bilat elbow pain off and on. Has had chronic pain since 2012.     Was seen in December 2020 and prescribed Prednisone which was \"very helpful\" for ~1 month.     States she has had inflammatory lab work done off and on and it has always been negative. She was seen by a rheumatologist who diagnosed her with Seronegative Mixed connective tissue disease she states not based on labs but based on presentation. She was started on plaquenil and \"life was so much better\" but then she started itching. She then tried methotrexate which helped with the pain but upset her stomach. She states that rheumatologist moved and the subsequent one's wouldn't continue the medication.     History of bariatric surgery. Bariatric revision " "surgery in the works to help stomach acid. States she is taking \"lots\" of ibuprofen for pain and protonix 40mg BID to counteract stomach pain from NSAID use.    Depression/ADHD: has been getting refills via Dr. Bhandari, [facility] in [place]. Insurance has recently changed and now she is not covered by him any longer. Requesting refill today. Bottle brought in with patient verifying current dose of 300mg.    Review of Systems   Constitutional: Negative.    Gastrointestinal: Positive for heartburn (chronic). Negative for abdominal pain.   Musculoskeletal: Positive for joint swelling.        Chronic pain; ongoing bilat elbow pain   Skin: Negative.             Objective    /80   Pulse 80   Temp 98  F (36.7  C) (Tympanic)   Resp 16   Ht 1.626 m (5' 4\")   Wt 148.1 kg (326 lb 9.6 oz)   SpO2 97%   BMI 56.06 kg/m    Body mass index is 56.06 kg/m .  Physical Exam  Vitals signs and nursing note reviewed.   Constitutional:       General: She is in acute distress (bilat elbow pain, hard to take jacket off in office).      Appearance: Normal appearance. She is not ill-appearing or toxic-appearing.   Musculoskeletal:      Right elbow: She exhibits decreased range of motion and swelling. She exhibits no effusion, no deformity and no laceration. Tenderness found. No radial head, no medial epicondyle, no lateral epicondyle and no olecranon process tenderness noted.      Left elbow: She exhibits decreased range of motion and swelling. She exhibits no effusion, no deformity and no laceration. Tenderness found. No radial head, no medial epicondyle, no lateral epicondyle and no olecranon process tenderness noted.   Neurological:      Mental Status: She is alert.            Results for orders placed or performed in visit on 03/15/21 (from the past 24 hour(s))   CBC with platelets and differential   Result Value Ref Range    WBC 9.1 4.0 - 11.0 10e9/L    RBC Count 4.55 3.8 - 5.2 10e12/L    Hemoglobin 12.8 11.7 - " 15.7 g/dL    Hematocrit 39.3 35.0 - 47.0 %    MCV 86 78 - 100 fl    MCH 28.1 26.5 - 33.0 pg    MCHC 32.6 31.5 - 36.5 g/dL    RDW 13.7 10.0 - 15.0 %    Platelet Count 249 150 - 450 10e9/L    % Neutrophils 67.4 %    % Lymphocytes 19.9 %    % Monocytes 7.0 %    % Eosinophils 5.2 %    % Basophils 0.5 %    Absolute Neutrophil 6.1 1.6 - 8.3 10e9/L    Absolute Lymphocytes 1.8 0.8 - 5.3 10e9/L    Absolute Monocytes 0.6 0.0 - 1.3 10e9/L    Absolute Eosinophils 0.5 0.0 - 0.7 10e9/L    Absolute Basophils 0.1 0.0 - 0.2 10e9/L    Diff Method Automated Method    ESR: Erythrocyte sedimentation rate   Result Value Ref Range    Sed Rate 14 0 - 20 mm/h

## 2021-03-16 ENCOUNTER — TELEPHONE (OUTPATIENT)
Dept: FAMILY MEDICINE | Facility: CLINIC | Age: 41
End: 2021-03-16

## 2021-03-16 DIAGNOSIS — M25.50 CHRONIC JOINT PAIN: Primary | ICD-10-CM

## 2021-03-16 DIAGNOSIS — G89.4 CHRONIC PAIN DISORDER: ICD-10-CM

## 2021-03-16 DIAGNOSIS — G89.29 CHRONIC JOINT PAIN: Primary | ICD-10-CM

## 2021-03-16 LAB
ANA SER QL IF: NEGATIVE
CCP AB SER IA-ACNC: 1 U/ML
RHEUMATOID FACT SER NEPH-ACNC: <7 IU/ML (ref 0–20)

## 2021-03-16 ASSESSMENT — ANXIETY QUESTIONNAIRES: GAD7 TOTAL SCORE: 4

## 2021-03-16 NOTE — TELEPHONE ENCOUNTER
Called patient and discussed lab results. Given history of chronic pain/joint pain and being seen by rheumatology in the past, she would like another referral. Referral placed.     Candace Hernandez DNP, NP-C 3/16/2021 3:07 PM

## 2021-04-08 DIAGNOSIS — G47.00 INSOMNIA, UNSPECIFIED TYPE: ICD-10-CM

## 2021-04-08 RX ORDER — HYDROXYZINE HYDROCHLORIDE 50 MG/1
TABLET, FILM COATED ORAL
Qty: 180 TABLET | Refills: 4 | Status: SHIPPED | OUTPATIENT
Start: 2021-04-08 | End: 2022-07-12

## 2021-04-08 NOTE — TELEPHONE ENCOUNTER
"Routing refill request to provider for review/approval because:  Need clarification - instructions are to take by mouth nightly as needed for itching. It was prescribed for insomnia.    Lina Barclay RN    Requested Prescriptions   Pending Prescriptions Disp Refills     hydrOXYzine (ATARAX) 50 MG tablet [Pharmacy Med Name: hydroxyzine HCl 50 mg tablet] 180 tablet 4     Sig: TAKE 2 TABLETS BY MOUTH AT BEDTIME AS NEEDED FOR ITCHING       Antihistamines Protocol Passed - 4/8/2021  1:21 PM        Passed - Recent (12 mo) or future (30 days) visit within the authorizing provider's specialty     Patient has had an office visit with the authorizing provider or a provider within the authorizing providers department within the previous 12 mos or has a future within next 30 days. See \"Patient Info\" tab in inbasket, or \"Choose Columns\" in Meds & Orders section of the refill encounter.              Passed - Patient is age 3 or older     Apply age and/or weight-based dosing for peds patients age 3 and older.    Forward request to provider for patients under the age of 3.          Passed - Medication is active on med list             "

## 2021-05-07 ENCOUNTER — OFFICE VISIT - HEALTHEAST (OUTPATIENT)
Dept: RHEUMATOLOGY | Facility: CLINIC | Age: 41
End: 2021-05-07

## 2021-05-07 DIAGNOSIS — M25.50 POLYARTHRALGIA: ICD-10-CM

## 2021-06-17 NOTE — PROGRESS NOTES
Alma Quinn is a 40 y.o. female who is being evaluated via a billable video visit.      How would you like to obtain your AVS? MyChart.  If dropped from the video visit, the video invitation should be resent by: Text to cell phone: [phone]  Will anyone else be joining your video visit? No    will be next to her    Video Start Time: 1:35 PM  Video-Visit Details    Type of service:  Video Visit    Video End Time (time video stopped): 1:50 PM  Originating Location (pt. Location): Home    Distant Location (provider location):  [facility]     Platform used for Video Visit: [employer]       This document was created using a software with less than 100% fidelity, at times resulting in unintended, even erroneous syntax and grammar.  The reader is advised to keep this under consideration while reviewing, interpreting this note.    ASSESSMENT AND PLAN:  Alma Quinn 40 y.o. female is seen here on 05/07/21 for evaluation of widespread joint pains going on for the past 12-1/2 years, she has been to a variety of physicians including multiple rheumatology evaluations.  At one point she was told that she might have mixed connective tissue disease.  Her LINDSEY, SARAH's as far back as could be examined were negative.  Further work-up as noted, we discussed how some of the signals of her symptoms are suggestive of inflammatory joint pain, will meet in person further course to be charted accordingly.  Diagnoses and all orders for this visit:    Polyarthralgia  -     HM1(CBC and Differential); Future; Expected date: 05/14/2021  -     Creatinine; Future; Expected date: 05/14/2021  -     Albumin; Future; Expected date: 05/14/2021  -     ALT (SGPT); Future; Expected date: 05/14/2021  -     Rheumatoid Factor Quant; Future; Expected date: 05/14/2021  -     CCP Antibodies; Future; Expected date: 05/14/2021  -     Hepatitis C Antibody (Anti-HCV); Future; Expected date: 05/14/2021  -     Uric Acid; Future;  "Expected date: 05/14/2021  -     Erythrocyte Sedimentation Rate; Future; Expected date: 05/14/2021  -     C-Reactive Protein; Future; Expected date: 05/14/2021  -     Antinuclear Antibody (LINDSEY) Cascade; Future; Expected date: 05/14/2021  -     Ferritin; Future; Expected date: 05/14/2021      HISTORY OF PRESENTING ILLNESS:  Alma Quinn, 40 y.o., female is here for evaluation of painful joints.  She reports that she hurts all over.  She has noted particularly more troublesome pain in her hands, shoulders hips knees and feet.  She has had left knee surgery for meniscus tear and has residual ACL MCL tear there.  She has had intermittent swelling of the right knee.  More recently she had noted more pain in her elbows, hips.  By this she means trochanteric areas.  This wakes her up from surgery.  At one point one of the several rheumatologist that she has been to made a diagnosis of connective tissue disease.  She was given prednisone which took it \"all her pain\".  She was given Plaquenil that was \"amazing\" till she developed allergy by which she means itching which was thought to be secondary to Plaquenil although she did not get a rash.  She was then started on methotrexate once again she found it helpful however she had significant GI side effects.  She has had low back pain.  She has had some response to corticosteroid injections but finally underwent lumbar fusion and has done well since.  She describes a grandmother and a daughter with psoriasis.  There is no personal or family history of ulcerative colitis Crohn's disease.  She works as a counselor for drug dependence management, she used to be [employer]..        ALLERGIES:Patient has no allergy information on record.    PAST MEDICAL/ACTIVE PROBLEMS/MEDICATION/ FAMILY HISTORY/SOCIAL DATA:  The patient has a family history of  No past medical history on file.  Social History     Tobacco Use   Smoking Status Not on file     There is no problem list on file for " this patient.    Current Outpatient Medications   Medication Sig Dispense Refill     albuterol (PROAIR HFA;PROVENTIL HFA;VENTOLIN HFA) 90 mcg/actuation inhaler INHALE 2 PUFFS BY MOUTH FOUR TIMES DAILY AS NEEDED FOR SHORTNESS OF BREATH(RINSE MOUTHPIECE WITH WARM WATER WEEKLY)       buPROPion (WELLBUTRIN XL) 300 MG 24 hr tablet Take 300 mg by mouth.       EPINEPHrine (EPIPEN/ADRENACLICK/AUVI-Q) 0.3 mg/0.3 mL injection Inject 0.3 mg into the shoulder, thigh, or buttocks.       hydrOXYzine HCL (ATARAX) 50 MG tablet TAKE 2 TABLETS BY MOUTH AT BEDTIME AS NEEDED FOR ITCHING       LORazepam (ATIVAN) 0.5 MG tablet Take 0.5-1 mg by mouth.       methocarbamoL (ROBAXIN) 500 MG tablet Take 500 mg by mouth.       omeprazole (PRILOSEC) 40 MG capsule Take 40 mg by mouth.       B complex-vitamin C-folic acid 400 mcg TbER SR tablet Take by mouth.       budesonide (PULMICORT) 1 mg/2 mL nebulizer solution Inhale 2 mg.       calcium-magnesium 300-300 mg Tab Take by mouth.       levonorgestreL (MIRENA) 20 mcg/24 hours (6 yrs) 52 mg IUD 1 Device by Intrauterine route.       melatonin 5 mg Tab tablet Take 5 mg by mouth.       omega-3 fatty acids 100 mg Chew Chew.       pediatric multivitamin (FLINTSTONES) Chew chewable tablet Chew 1 tablet.       turmeric, bulk, 100 % Powd Take by mouth.       No current facility-administered medications for this visit.        COMPREHENSIVE EXAMINATION:  There were no vitals filed for this visit.  A well appearing alert oriented female. Well appearing alert oriented.   Examination of the eyes revealed no redness, obvious scleromalacia.  ENT examination shows no nasal deformity such as of saddle type, external ear without signs of inflamm/deformity ation.  Cardiopulmonary examination without obvious signs of dyspnea, no wheezing is audible, no cyanosis.  There is no finger clubbing.  Skin examination performed for heliotrope, malar area eruption periungual erythema, lupus pernio.  Neurological examination  shows the speech is fluent, no facial asymmetry, muscle power in the upper extremities proximally appears to be normal.  In the psychiatric examination the memory, orientation, attention, affect were observable and normal.  Joint examination of the DIPs, PIPs, MCPs, IP joints of the thumbs, wrists, elbows, for swelling, range of motion, for shoulders range of motion evaluated.  There is no dactylitis of digits no swelling the small joints of the wrist where she is able to fully flex them into fist, wrist elbows shoulder range of motion is full as best as can be assessed in the current situation.    LAB / IMAGING DATA:  No results found for: ALT, ALBUMIN, CREATININE    No results found for: WBC, HGB, PLT    No results found for: LINDSEY, RF, SEDRATE

## 2021-08-06 ENCOUNTER — OFFICE VISIT (OUTPATIENT)
Dept: FAMILY MEDICINE | Facility: CLINIC | Age: 41
End: 2021-08-06
Payer: COMMERCIAL

## 2021-08-06 VITALS
TEMPERATURE: 97.1 F | SYSTOLIC BLOOD PRESSURE: 137 MMHG | BODY MASS INDEX: 50.02 KG/M2 | HEIGHT: 64 IN | HEART RATE: 77 BPM | OXYGEN SATURATION: 99 % | WEIGHT: 293 LBS | DIASTOLIC BLOOD PRESSURE: 80 MMHG | RESPIRATION RATE: 14 BRPM

## 2021-08-06 DIAGNOSIS — M62.830 BACK MUSCLE SPASM: ICD-10-CM

## 2021-08-06 DIAGNOSIS — K21.9 GASTROESOPHAGEAL REFLUX DISEASE, UNSPECIFIED WHETHER ESOPHAGITIS PRESENT: ICD-10-CM

## 2021-08-06 DIAGNOSIS — M25.50 POLYARTHRALGIA: ICD-10-CM

## 2021-08-06 DIAGNOSIS — F31.9 BIPOLAR AFFECTIVE DISORDER, REMISSION STATUS UNSPECIFIED (H): ICD-10-CM

## 2021-08-06 DIAGNOSIS — M51.369 DDD (DEGENERATIVE DISC DISEASE), LUMBAR: ICD-10-CM

## 2021-08-06 DIAGNOSIS — J45.20 INTERMITTENT ASTHMA, UNSPECIFIED ASTHMA SEVERITY, UNSPECIFIED WHETHER COMPLICATED: ICD-10-CM

## 2021-08-06 DIAGNOSIS — E66.01 MORBID OBESITY (H): ICD-10-CM

## 2021-08-06 LAB
ALBUMIN SERPL-MCNC: 3.4 G/DL (ref 3.4–5)
ALT SERPL W P-5'-P-CCNC: 24 U/L (ref 0–50)
BASOPHILS # BLD AUTO: 0 10E3/UL (ref 0–0.2)
BASOPHILS NFR BLD AUTO: 1 %
CREAT SERPL-MCNC: 0.91 MG/DL (ref 0.52–1.04)
CRP SERPL-MCNC: 10.9 MG/L (ref 0–8)
EOSINOPHIL # BLD AUTO: 0.6 10E3/UL (ref 0–0.7)
EOSINOPHIL NFR BLD AUTO: 7 %
ERYTHROCYTE [DISTWIDTH] IN BLOOD BY AUTOMATED COUNT: 13.5 % (ref 10–15)
ERYTHROCYTE [SEDIMENTATION RATE] IN BLOOD BY WESTERGREN METHOD: 13 MM/HR (ref 0–20)
FERRITIN SERPL-MCNC: 52 NG/ML (ref 12–150)
GFR SERPL CREATININE-BSD FRML MDRD: 78 ML/MIN/1.73M2
HCT VFR BLD AUTO: 39.1 % (ref 35–47)
HCV AB SERPL QL IA: NONREACTIVE
HGB BLD-MCNC: 12.8 G/DL (ref 11.7–15.7)
LYMPHOCYTES # BLD AUTO: 2 10E3/UL (ref 0.8–5.3)
LYMPHOCYTES NFR BLD AUTO: 24 %
MCH RBC QN AUTO: 27.7 PG (ref 26.5–33)
MCHC RBC AUTO-ENTMCNC: 32.7 G/DL (ref 31.5–36.5)
MCV RBC AUTO: 85 FL (ref 78–100)
MONOCYTES # BLD AUTO: 0.7 10E3/UL (ref 0–1.3)
MONOCYTES NFR BLD AUTO: 8 %
NEUTROPHILS # BLD AUTO: 4.9 10E3/UL (ref 1.6–8.3)
NEUTROPHILS NFR BLD AUTO: 60 %
PLATELET # BLD AUTO: 269 10E3/UL (ref 150–450)
RBC # BLD AUTO: 4.62 10E6/UL (ref 3.8–5.2)
URATE SERPL-MCNC: 4.4 MG/DL (ref 2.6–6)
WBC # BLD AUTO: 8.1 10E3/UL (ref 4–11)

## 2021-08-06 PROCEDURE — 85652 RBC SED RATE AUTOMATED: CPT | Performed by: NURSE PRACTITIONER

## 2021-08-06 PROCEDURE — 84460 ALANINE AMINO (ALT) (SGPT): CPT | Performed by: NURSE PRACTITIONER

## 2021-08-06 PROCEDURE — 82040 ASSAY OF SERUM ALBUMIN: CPT | Performed by: NURSE PRACTITIONER

## 2021-08-06 PROCEDURE — 82565 ASSAY OF CREATININE: CPT | Performed by: NURSE PRACTITIONER

## 2021-08-06 PROCEDURE — 36415 COLL VENOUS BLD VENIPUNCTURE: CPT | Performed by: NURSE PRACTITIONER

## 2021-08-06 PROCEDURE — 84550 ASSAY OF BLOOD/URIC ACID: CPT | Performed by: NURSE PRACTITIONER

## 2021-08-06 PROCEDURE — 86200 CCP ANTIBODY: CPT | Performed by: NURSE PRACTITIONER

## 2021-08-06 PROCEDURE — 82728 ASSAY OF FERRITIN: CPT | Performed by: NURSE PRACTITIONER

## 2021-08-06 PROCEDURE — 96127 BRIEF EMOTIONAL/BEHAV ASSMT: CPT | Performed by: NURSE PRACTITIONER

## 2021-08-06 PROCEDURE — 85025 COMPLETE CBC W/AUTO DIFF WBC: CPT | Performed by: NURSE PRACTITIONER

## 2021-08-06 PROCEDURE — 99213 OFFICE O/P EST LOW 20 MIN: CPT | Performed by: NURSE PRACTITIONER

## 2021-08-06 PROCEDURE — 86038 ANTINUCLEAR ANTIBODIES: CPT | Performed by: NURSE PRACTITIONER

## 2021-08-06 PROCEDURE — 86140 C-REACTIVE PROTEIN: CPT | Performed by: NURSE PRACTITIONER

## 2021-08-06 PROCEDURE — 86803 HEPATITIS C AB TEST: CPT | Performed by: NURSE PRACTITIONER

## 2021-08-06 PROCEDURE — 86431 RHEUMATOID FACTOR QUANT: CPT | Performed by: NURSE PRACTITIONER

## 2021-08-06 RX ORDER — FLUTICASONE PROPIONATE 110 UG/1
1 AEROSOL, METERED RESPIRATORY (INHALATION) 2 TIMES DAILY
Qty: 12 G | Refills: 11 | Status: SHIPPED | OUTPATIENT
Start: 2021-08-06 | End: 2022-10-17

## 2021-08-06 RX ORDER — ALBUTEROL SULFATE 90 UG/1
2 AEROSOL, METERED RESPIRATORY (INHALATION) EVERY 4 HOURS PRN
Qty: 18 G | Refills: 4 | Status: SHIPPED | OUTPATIENT
Start: 2021-08-06 | End: 2022-06-08

## 2021-08-06 RX ORDER — OMEPRAZOLE 40 MG/1
40 CAPSULE, DELAYED RELEASE ORAL DAILY
Qty: 60 CAPSULE | Refills: 3 | Status: SHIPPED | OUTPATIENT
Start: 2021-08-06 | End: 2022-07-01

## 2021-08-06 RX ORDER — MONTELUKAST SODIUM 10 MG/1
10 TABLET ORAL AT BEDTIME
Qty: 90 TABLET | Refills: 3 | Status: SHIPPED | OUTPATIENT
Start: 2021-08-06 | End: 2022-07-12

## 2021-08-06 RX ORDER — BUPROPION HYDROCHLORIDE 300 MG/1
300 TABLET ORAL EVERY MORNING
Qty: 90 TABLET | Refills: 4 | Status: SHIPPED | OUTPATIENT
Start: 2021-08-06 | End: 2022-10-17

## 2021-08-06 RX ORDER — BUDESONIDE 0.25 MG/2ML
0.25 INHALANT ORAL DAILY
Qty: 60 ML | Refills: 12 | Status: SHIPPED | OUTPATIENT
Start: 2021-08-06 | End: 2023-05-09

## 2021-08-06 RX ORDER — METHOCARBAMOL 500 MG/1
500 TABLET, FILM COATED ORAL 4 TIMES DAILY PRN
Qty: 20 TABLET | Refills: 0 | Status: SHIPPED | OUTPATIENT
Start: 2021-08-06 | End: 2023-09-14

## 2021-08-06 ASSESSMENT — ANXIETY QUESTIONNAIRES
5. BEING SO RESTLESS THAT IT IS HARD TO SIT STILL: NOT AT ALL
6. BECOMING EASILY ANNOYED OR IRRITABLE: MORE THAN HALF THE DAYS
3. WORRYING TOO MUCH ABOUT DIFFERENT THINGS: MORE THAN HALF THE DAYS
2. NOT BEING ABLE TO STOP OR CONTROL WORRYING: NOT AT ALL
IF YOU CHECKED OFF ANY PROBLEMS ON THIS QUESTIONNAIRE, HOW DIFFICULT HAVE THESE PROBLEMS MADE IT FOR YOU TO DO YOUR WORK, TAKE CARE OF THINGS AT HOME, OR GET ALONG WITH OTHER PEOPLE: SOMEWHAT DIFFICULT
1. FEELING NERVOUS, ANXIOUS, OR ON EDGE: NOT AT ALL
GAD7 TOTAL SCORE: 5
7. FEELING AFRAID AS IF SOMETHING AWFUL MIGHT HAPPEN: NOT AT ALL

## 2021-08-06 ASSESSMENT — PATIENT HEALTH QUESTIONNAIRE - PHQ9
5. POOR APPETITE OR OVEREATING: SEVERAL DAYS
SUM OF ALL RESPONSES TO PHQ QUESTIONS 1-9: 5

## 2021-08-06 ASSESSMENT — MIFFLIN-ST. JEOR: SCORE: 2123.26

## 2021-08-06 ASSESSMENT — PAIN SCALES - GENERAL: PAINLEVEL: MODERATE PAIN (5)

## 2021-08-06 NOTE — PROGRESS NOTES
Assessment & Plan     Bipolar affective disorder, remission status unspecified (H)  Follows with therapist.  Has seen psychiatry in the past.  Refilled Wellbutrin today.  - buPROPion (WELLBUTRIN XL) 300 MG 24 hr tablet; Take 1 tablet (300 mg) by mouth every morning    Morbid obesity (H)  Encouraged ongoing efforts at weight management.  Did not discuss further today.    Intermittent asthma, unspecified asthma severity, unspecified whether complicated  Intermittent questionable persistent asthma.  Will try daily fluticasone.  Albuterol inhaler refilled along with budesonide.  Recommend following up in 4 weeks to recheck asthma control.  - fluticasone (FLOVENT HFA) 110 MCG/ACT inhaler; Inhale 1 puff into the lungs 2 times daily  - albuterol (PROAIR HFA/PROVENTIL HFA/VENTOLIN HFA) 108 (90 Base) MCG/ACT inhaler; Inhale 2 puffs into the lungs every 4 hours as needed for shortness of breath / dyspnea  - montelukast (SINGULAIR) 10 MG tablet; Take 1 tablet (10 mg) by mouth At Bedtime  - budesonide (PULMICORT) 0.25 MG/2ML neb solution; Take 2 mLs (0.25 mg) by nebulization daily For asthma    DDD (degenerative disc disease), lumbar  Chronic history of degenerative disc disease.  Refilled Robaxin for as needed use.  - methocarbamol (ROBAXIN) 500 MG tablet; Take 1 tablet (500 mg) by mouth 4 times daily as needed for muscle spasms    Back muscle spasm  Chronic history of back spasm with degenerative disc disease.  Filled methocarbamol.  - methocarbamol (ROBAXIN) 500 MG tablet; Take 1 tablet (500 mg) by mouth 4 times daily as needed for muscle spasms    Gastroesophageal reflux disease, unspecified whether esophagitis present  Omeprazole refilled for patient today.  I suspect this would greatly improve with weight loss.  - omeprazole (PRILOSEC) 40 MG DR capsule; Take 1 capsule (40 mg) by mouth daily    Polyarthralgia  Following with rheumatology for polyarthralgia.  Labs released today.  - ALT  - Rheumatoid factor  - Cyclic  "Citrullinated Peptide Antibody IgG  - Hepatitis C antibody  - Uric acid  - Erythrocyte sedimentation rate auto  - CRP inflammation  - Anti Nuclear Lilia IgG by IFA with Reflex  - Ferritin  - CBC with Platelets & Differential  - Creatinine  - Albumin level             BMI:   Estimated body mass index is 56.13 kg/m  as calculated from the following:    Height as of this encounter: 1.626 m (5' 4\").    Weight as of this encounter: 148.3 kg (327 lb).           Return in about 4 weeks (around 9/3/2021) for ongoing symptoms if not improving.    Shanna John, DELANO CNP  [facility]    Sulaiman Thompson is a 41 year old who presents for the following health issues     HPI     Depression and Anxiety Follow-Up    How are you doing with your depression since your last visit? Worsened lack sleep.  Continues to utilize Wellbutrin.  Diagnosed with bipolar type I.  She also attributes much of her symptoms related to PTSD.  She does not feel that she needs any medication dose adjustments today.    How are you doing with your anxiety since your last visit?  Worsened lack of sleep and work issues continues to see therapy and work through PTSD and acknowledges that this is lifelong and will always be something that she will work on.    Are you having other symptoms that might be associated with depression or anxiety? No    Have you had a significant life event? Job Concerns and OTHER: sleep issues     Do you have any concerns with your use of alcohol or other drugs? No   General feels that her depression and anxiety is on a stable course for management.  She does not feel that she needs to make any medication adjustments at this time.    Social History     Tobacco Use     Smoking status: Never Smoker     Smokeless tobacco: Never Used     Tobacco comment: 1/4 pack daily   Vaping Use     Vaping Use: Never used   Substance Use Topics     Alcohol use: No     Drug use: No     PHQ 1/24/2020 3/15/2021 8/6/2021 "   PHQ-9 Total Score 2 3 5   Q9: Thoughts of better off dead/self-harm past 2 weeks Not at all Not at all Not at all     YO-7 SCORE 1/24/2020 3/15/2021 8/6/2021   Total Score 9 4 5     Last PHQ-9 8/6/2021   1.  Little interest or pleasure in doing things 0   2.  Feeling down, depressed, or hopeless 0   3.  Trouble falling or staying asleep, or sleeping too much 2   4.  Feeling tired or having little energy 1   5.  Poor appetite or overeating 0   6.  Feeling bad about yourself 0   7.  Trouble concentrating 2   8.  Moving slowly or restless 0   Q9: Thoughts of better off dead/self-harm past 2 weeks 0   PHQ-9 Total Score 5   Difficulty at work, home, or with people Somewhat difficult     YO-7  8/6/2021   1. Feeling nervous, anxious, or on edge 0   2. Not being able to stop or control worrying 0   3. Worrying too much about different things 2   4. Trouble relaxing 1   5. Being so restless that it is hard to sit still 0   6. Becoming easily annoyed or irritable 2   7. Feeling afraid, as if something awful might happen 0   YO-7 Total Score 5   If you checked any problems, how difficult have they made it for you to do your work, take care of things at home, or get along with other people? Somewhat difficult       Suicide Assessment Five-step Evaluation and Treatment (SAFE-T)    Asthma Follow-Up    Was ACT completed today?    Yes    ACT Total Scores 8/6/2021   ACT TOTAL SCORE -   ASTHMA ER VISITS -   ASTHMA HOSPITALIZATIONS -   ACT TOTAL SCORE (Goal Greater than or Equal to 20) 16   In the past 12 months, how many times did you visit the emergency room for your asthma without being admitted to the hospital? 0   In the past 12 months, how many times were you hospitalized overnight because of your asthma? 0         How many days per week do you miss taking your asthma controller medication?  I do not have an asthma controller medication    Please describe any recent triggers for your asthma: smoke, pollens, animal dander,  "humidity and bee stings    Have you had any Emergency Room Visits, Urgent Care Visits, or Hospital Admissions since your last office visit?  No  Overall patient feels that her asthma is significantly bothersome.  She has not been on a controller medication for several years.  She has been utilizing her budesonide but feels that she would benefit to have more of this refilled.  She has not been using this for a while.  In addition she has been utilizing her rescue inhaler on a daily basis.      How many servings of fruits and vegetables do you eat daily?  4 or more    On average, how many sweetened beverages do you drink each day (Examples: soda, juice, sweet tea, etc.  Do NOT count diet or artificially sweetened beverages)?   0    How many days per week do you exercise enough to make your heart beat faster? 5    How many minutes a day do you exercise enough to make your heart beat faster? 30 - 60    How many days per week do you miss taking your medication? 0    Medication Followup of Wellbutrin, budesonide, methocarbamol,omeprazole    Taking Medication as prescribed: yes    Side Effects:  None    Medication Helping Symptoms:  yes     She is requesting refills of these medications.  In terms of the methocarbamol she does not take this on a regular basis but likes to have it on hand for when she is having acute muscle spasms.  Omeprazole overall improves her reflux symptoms.  Weight continues to be a struggle for her she is working on eating balanced diet.      Review of Systems   Constitutional, HEENT, cardiovascular, pulmonary, gi and gu systems are negative, except as otherwise noted.      Objective    /80 (BP Location: Right arm, Patient Position: Chair, Cuff Size: Adult Large)   Pulse 77   Temp 97.1  F (36.2  C) (Tympanic)   Resp 14   Ht 1.626 m (5' 4\")   Wt 148.3 kg (327 lb)   LMP  (LMP Unknown)   SpO2 99%   Breastfeeding No   BMI 56.13 kg/m    Body mass index is 56.13 kg/m .     Physical Exam "   GENERAL: healthy, alert and no distress  NECK: no adenopathy, no asymmetry, masses, or scars and thyroid normal to palpation  RESP: lungs clear to auscultation - no rales, rhonchi or wheezes  CV: regular rate and rhythm, normal S1 S2, no S3 or S4, no murmur, click or rub, no peripheral edema and peripheral pulses strong  ABDOMEN: soft, nontender, no hepatosplenomegaly, no masses and bowel sounds normal  MS: no gross musculoskeletal defects noted, no edema

## 2021-08-06 NOTE — PATIENT INSTRUCTIONS
Recommend following up in about 4 weeks if asthma symptoms are not improving with the fluticasone.  We could try other medication management for this if needed.  It was nice to meet you today.

## 2021-08-07 ASSESSMENT — ANXIETY QUESTIONNAIRES: GAD7 TOTAL SCORE: 5

## 2021-08-07 ASSESSMENT — ASTHMA QUESTIONNAIRES: ACT_TOTALSCORE: 16

## 2021-08-09 LAB
ANA SER QL IF: NEGATIVE
CCP AB SER IA-ACNC: 1.4 U/ML
RHEUMATOID FACT SER NEPH-ACNC: 8 IU/ML

## 2021-09-12 ENCOUNTER — HEALTH MAINTENANCE LETTER (OUTPATIENT)
Age: 41
End: 2021-09-12

## 2021-10-04 ENCOUNTER — VIRTUAL VISIT (OUTPATIENT)
Dept: FAMILY MEDICINE | Facility: CLINIC | Age: 41
End: 2021-10-04
Payer: COMMERCIAL

## 2021-10-04 DIAGNOSIS — J40 BRONCHITIS: Primary | ICD-10-CM

## 2021-10-04 PROCEDURE — 99213 OFFICE O/P EST LOW 20 MIN: CPT | Mod: TEL | Performed by: NURSE PRACTITIONER

## 2021-10-04 RX ORDER — ALBUTEROL SULFATE 0.83 MG/ML
2.5 SOLUTION RESPIRATORY (INHALATION) EVERY 4 HOURS PRN
Qty: 300 ML | Refills: 3 | Status: SHIPPED | OUTPATIENT
Start: 2021-10-04 | End: 2023-09-14

## 2021-10-04 RX ORDER — DOXYCYCLINE HYCLATE 100 MG
100 TABLET ORAL 2 TIMES DAILY
Qty: 14 TABLET | Refills: 0 | Status: SHIPPED | OUTPATIENT
Start: 2021-10-04 | End: 2021-10-11

## 2021-10-04 RX ORDER — PREDNISONE 20 MG/1
40 TABLET ORAL DAILY
Qty: 10 TABLET | Refills: 0 | Status: SHIPPED | OUTPATIENT
Start: 2021-10-04 | End: 2021-10-09

## 2021-10-04 NOTE — PROGRESS NOTES
"Donna is a 41 year old who is being evaluated via a billable telephone visit.      What phone number would you like to be contacted at? mobile  How would you like to obtain your AVS? MyChart    Assessment & Plan     Bronchitis  Concomitant underlying disease requiring treatment:   - albuterol (PROVENTIL) (2.5 MG/3ML) 0.083% neb solution; Take 1 vial (2.5 mg) by nebulization every 4 hours as needed for shortness of breath / dyspnea or wheezing  - doxycycline hyclate (VIBRA-TABS) 100 MG tablet; Take 1 tablet (100 mg) by mouth 2 times daily for 7 days  - predniSONE (DELTASONE) 20 MG tablet; Take 2 tablets (40 mg) by mouth daily for 5 days           BMI:   Estimated body mass index is 56.13 kg/m  as calculated from the following:    Height as of 8/6/21: 1.626 m (5' 4\").    Weight as of 8/6/21: 148.3 kg (327 lb).       FUTURE APPOINTMENTS:       - Follow-up visit in 3-5 days for persistent symptoms , sooner PRN new or worsening symptoms.     See Patient Instructions    No follow-ups on file.    Randi Lynn, APRN CNP  [facility]    Subjective   Donna is a 41 year old who presents for the following health issues  HPI     Acute Illness - cough / concerned about bronchitis  Acute illness concerns: having difficulty breathing. Had a cold last week. Took 3 covid tests,  also took one - all negative.   Onset/Duration: Since last week. Started as a cold- the whole house had it. The 2 kids are improved and back to school.  is still sick, not as bad as her. All of tested negative for COVID. She has proceeded to get worse.   Symptoms:  Fever: YES  Chills/Sweats: YES  Headache (location?): YES  Sinus Pressure: YES- left  Conjunctivitis:  no  Ear Pain: YES: left  Rhinorrhea: no  Congestion: YES  Sore Throat: YES  Cough: YES-with shortness of breath, worsening over time  Wheeze: YES  Decreased Appetite: YES  Nausea: no  Vomiting: no  Diarrhea: no  Dysuria/Freq.: no  Dysuria or " Hematuria: no  Fatigue/Achiness: YES- chest pain with coughing. Denies lower extremity edema or hx of DVT/PE.  Sick/Strep Exposure: YES - 2 kids in middle & high school  Therapies tried and outcome: Inhalers and nebulizers from having asthma    Review of Systems   Constitutional, HEENT, cardiovascular, pulmonary, gi and gu systems are negative, except as otherwise noted.      Objective    Vitals - Patient Reported  Temperature (Patient Reported): 96.8  F (36  C)        Physical Exam   alert and no distress  PSYCH: Alert and oriented times 3; coherent speech, normal   rate and volume, able to articulate logical thoughts, able   to abstract reason, no tangential thoughts, no hallucinations   or delusions  Her affect is normal  RESP: Audible barky cough, no audible wheezing, able to talk in full sentences  Remainder of exam unable to be completed due to telephone visits        Phone call duration: 15 minutes

## 2021-11-24 ENCOUNTER — OFFICE VISIT (OUTPATIENT)
Dept: FAMILY MEDICINE | Facility: CLINIC | Age: 41
End: 2021-11-24
Payer: COMMERCIAL

## 2021-11-24 VITALS
WEIGHT: 293 LBS | SYSTOLIC BLOOD PRESSURE: 110 MMHG | HEART RATE: 75 BPM | DIASTOLIC BLOOD PRESSURE: 76 MMHG | BODY MASS INDEX: 55.61 KG/M2 | TEMPERATURE: 98.6 F | OXYGEN SATURATION: 98 % | RESPIRATION RATE: 12 BRPM

## 2021-11-24 DIAGNOSIS — Z90.3 S/P GASTRECTOMY: ICD-10-CM

## 2021-11-24 DIAGNOSIS — M51.369 DDD (DEGENERATIVE DISC DISEASE), LUMBAR: ICD-10-CM

## 2021-11-24 DIAGNOSIS — K21.9 GASTROESOPHAGEAL REFLUX DISEASE, UNSPECIFIED WHETHER ESOPHAGITIS PRESENT: ICD-10-CM

## 2021-11-24 DIAGNOSIS — G47.33 OSA (OBSTRUCTIVE SLEEP APNEA): ICD-10-CM

## 2021-11-24 DIAGNOSIS — F31.9 BIPOLAR 1 DISORDER (H): ICD-10-CM

## 2021-11-24 DIAGNOSIS — Z01.818 PREOP GENERAL PHYSICAL EXAM: Primary | ICD-10-CM

## 2021-11-24 DIAGNOSIS — Z98.84 STATUS POST BARIATRIC SURGERY: ICD-10-CM

## 2021-11-24 DIAGNOSIS — E66.01 MORBID OBESITY (H): ICD-10-CM

## 2021-11-24 DIAGNOSIS — Z01.812 ENCOUNTER FOR PREOPERATIVE SCREENING LABORATORY TESTING FOR COVID-19 VIRUS: ICD-10-CM

## 2021-11-24 DIAGNOSIS — M79.7 FIBROMYALGIA: ICD-10-CM

## 2021-11-24 DIAGNOSIS — Z11.52 ENCOUNTER FOR PREOPERATIVE SCREENING LABORATORY TESTING FOR COVID-19 VIRUS: ICD-10-CM

## 2021-11-24 PROCEDURE — 99214 OFFICE O/P EST MOD 30 MIN: CPT | Performed by: FAMILY MEDICINE

## 2021-11-24 PROCEDURE — U0003 INFECTIOUS AGENT DETECTION BY NUCLEIC ACID (DNA OR RNA); SEVERE ACUTE RESPIRATORY SYNDROME CORONAVIRUS 2 (SARS-COV-2) (CORONAVIRUS DISEASE [COVID-19]), AMPLIFIED PROBE TECHNIQUE, MAKING USE OF HIGH THROUGHPUT TECHNOLOGIES AS DESCRIBED BY CMS-2020-01-R: HCPCS | Performed by: FAMILY MEDICINE

## 2021-11-24 PROCEDURE — U0005 INFEC AGEN DETEC AMPLI PROBE: HCPCS | Performed by: FAMILY MEDICINE

## 2021-11-24 ASSESSMENT — PAIN SCALES - GENERAL: PAINLEVEL: NO PAIN (0)

## 2021-11-24 NOTE — PROGRESS NOTES
[facility]  [address] [place] [id number]  Phone: [phone]  Fax: [phone]  Primary Provider: Tomeka Leger  Pre-op Performing Provider: ROMIE CHEUNG      PREOPERATIVE EVALUATION:  Today's date: 11/24/2021    Alma Quinn is a 41 year old female who presents for a preoperative evaluation.    Surgical Information:  Surgery/Procedure: Esophageal Scope   Surgery Location: [facility]  Surgeon: Unsure - Dr. Chahal  Surgery Date: 11/26/21  Time of Surgery: TBD  Where patient plans to recover: At home with family  Fax number for surgical facility:    Type of Anesthesia Anticipated: Local with MAC    Assessment & Plan     The proposed surgical procedure is considered LOW risk.      ICD-10-CM    1. Preop general physical exam  Z01.818    2. Gastroesophageal reflux disease, unspecified whether esophagitis present  K21.9    3. Encounter for preoperative screening laboratory testing for COVID-19 virus  Z01.812 Asymptomatic COVID-19 Virus (Coronavirus) by PCR Nose    Z20.822    4. Bipolar 1 disorder (H)  F31.9    5. Morbid obesity (H)  E66.01    6. S/P gastrectomy  Z90.3    7. Fibromyalgia  M79.7    8. SAMEER (obstructive sleep apnea)  G47.33    9. DDD (degenerative disc disease), lumbar  M51.36    10. Status post bariatric surgery  Z98.84        Risks and Recommendations:  The patient has the following additional risks and recommendations for perioperative complications:   - No identified additional risk factors other than previously addressed    Medication Instructions:  Patient is to take all scheduled medications on the day of surgery    RECOMMENDATION:  APPROVAL GIVEN to proceed with proposed procedure, without further diagnostic evaluation.      Subjective     HPI related to upcoming procedure:   41-year-old female presents for a preop physical exam.  Patient is scheduled to have upper GI endoscopy.  She requires evaluation and anesthesia risk assessment prior to  undergoing surgery/procedure. Patient denies any fever, chills, sore throat, cough, shortness of breath, chest pain, palpitation, diarrhea, constipation, headache or other relevant systemic symptoms..    Preop Questions 11/24/2021   1. Have you ever had a heart attack or stroke? No   2. Have you ever had surgery on your heart or blood vessels, such as a stent placement, a coronary artery bypass, or surgery on an artery in your head, neck, heart, or legs? No   3. Do you have chest pain with activity? No   4. Do you have a history of  heart failure? No   5. Do you currently have a cold, bronchitis or symptoms of other infection? No   6. Do you have a cough, shortness of breath, or wheezing? YES - asthma - wheezing   7. Do you or anyone in your family have previous history of blood clots? No   8. Do you or does anyone in your family have a serious bleeding problem such as prolonged bleeding following surgeries or cuts? No   9. Have you ever had problems with anemia or been told to take iron pills? YES - during pregnancy   10. Have you had any abnormal blood loss such as black, tarry or bloody stools, or abnormal vaginal bleeding? No   11. Have you ever had a blood transfusion? UNKNOWN   12. Are you willing to have a blood transfusion if it is medically needed before, during, or after your surgery? Yes   13. Have you or any of your relatives ever had problems with anesthesia? YES - personal hx - trouble waking up   14. Do you have sleep apnea, excessive snoring or daytime drowsiness? No   15. Do you have any artifical heart valves or other implanted medical devices like a pacemaker, defibrillator, or continuous glucose monitor? No   16. Do you have artificial joints? No   17. Are you allergic to latex? YES: contact dermatitis   18. Is there any chance that you may be pregnant? No       Health Care Directive:  Patient does not have a Health Care Directive or Living Will: Discussed advance care planning with patient;  information given to patient to review.    Preoperative Review of :   reviewed - no record of controlled substances prescribed.      Review of Systems  Constitutional, neuro, ENT, endocrine, pulmonary, cardiac, gastrointestinal, genitourinary, musculoskeletal, integument and psychiatric systems are negative, except as otherwise noted.    Patient Active Problem List    Diagnosis Date Noted     Bipolar 1 disorder (H) 04/13/2011     Priority: High     Sees psychiatric nurse, Margaux Azul NP with [facility]       Morbid obesity (H) 01/24/2020     Priority: Medium     GERD (gastroesophageal reflux disease) 10/19/2012     Priority: Medium     S/P gastrectomy 07/11/2012     Priority: Medium     Overview:   Lap Sleeve Gastrectomy by Dr. Ephraim Blood from bariatric surgery. DOS 7/11/2012 at [facility], [place], [place]       Lumbar radiculopathy 11/04/2011     Priority: Medium     Acute bilateral leg weakness 2/11 and found to have L5-S1 herniated disk with compression of left L5 nerve root.    S/p left L5-S1 hemilaminectomy and microdiscectomy 2/24/11.  Continues to have residual left leg weakness and pain       Weakness of left leg 11/04/2011     Priority: Medium     Fibromyalgia 05/23/2011     Priority: Medium     DDD (degenerative disc disease), lumbar 04/13/2011     Priority: Medium     MRI 2/11- L5-S1 large central posterior extruded disc herniation extending inferior to the disc level, causing slight impression on  thecal sac and left S1 nerve root sleeve. S/p Left L5-S1 hemilaminectomy discectomy.        SAMEER (obstructive sleep apnea) 06/17/2010     Priority: Medium     Not tolerating CPAP, has deviated septum - referred to ENT in 12/09 and prescribed flonase       Seasonal allergic rhinitis 06/17/2010     Priority: Medium     Intermittent asthma 06/17/2010     Priority: Medium     Uses inhaled steroid in fall, typically       Chronic pain disorder 06/17/2010     Priority: Medium      Started in 2008 3 months postpartum.  Pain in her entire body with muscle cramping- arms, legs, elbows, knees, ankles, hips, shoulders, fingers etc.  Has been on multiple meds icluding gabapentin, cymbalta, NSAIDs, flexeril and narcotics with minimal benefit.  High doses of prednisone helped some.  Insurance would not cover lyrica.  Mild elevation of ESR to ~30 and CRP to ~24.  RF was 14 (normal 0-14).  CCP was negative  MRI 4/09:  Degenerative changes at L5-S1, mod to mild corby foraminal narrowing with mild facet arthropathy, no nerve impingement  Negative ehrlichia and lyme testing in 5/09  Mildly decreased vitamin D level at 20 in 10/08 - corrected to 25 in 1/09, then put on 50,000 units/week  Vitamin D level 5/10 was 16  ? Fibromyalgia vs chronic pain syndrome vs vitamin D deficiency?  Normal hand and foot xrays 7/09  Seen by Dr. Gildardo Villeda, rheumatology 6/10 - lab w/u and exam negative for RA.  Vitamin D levels low - recommended vitamin D3 4000 units/day x 8 weeks, then 2000 units/day.  Also referral to PT for iliotibial band syndrome and corby knee pain.  Also referral to rheumatology nursing associates for fibromyalgia.  Also emphasized need to treat SAMEER and possible bariatric surgery.       PCOS (polycystic ovarian syndrome) 06/17/2010     Priority: Medium     Generalized osteoarthrosis, involving multiple sites 05/07/2010     Priority: Medium     Bipolar I disorder (H) 05/07/2010     Priority: Medium     Status post bariatric surgery 02/04/2009     Priority: Medium     Posttraumatic stress disorder 09/16/2008     Priority: Medium     CARDIOVASCULAR SCREENING; LDL GOAL LESS THAN 160 10/31/2010     Priority: Low      Past Medical History:   Diagnosis Date     Cystic kidney disease      Depression      PONV (postoperative nausea and vomiting)      Post traumatic stress disorder      Seasonal allergies      Toxemia      Uncomplicated asthma      Past Surgical History:   Procedure Laterality Date      ADENOIDECTOMY       APPENDECTOMY       ARTHROSCOPY KNEE WITH MEDIAL MENISCECTOMY  2014    Procedure: ARTHROSCOPY KNEE WITH MEDIAL MENISCECTOMY;  Surgeon: Arron Curtis MD;  Location: [facility]     ARTHROSCOPY KNEE WITH MEDIAL MENISCECTOMY Left 2015    Procedure: ARTHROSCOPY KNEE WITH MEDIAL MENISCECTOMY;  Surgeon: Arron Curtis MD;  Location: [facility]     C/SECTION, CLASSICAL      , Classical     CHOLECYSTECTOMY, LAPOROSCOPIC  10/07    Cholecystectomy, Laparoscopic     HEMILAMINECTOMY, DISCECTOMY LUMBAR TWO LEVELS, COMBINED  11    Left L5-S1 hemilaminectomy for discectomy      LAPAROSCOPIC GASTRIC SLEEVE  12    lap sleeve gastrectomy     SURGICAL HISTORY OF -       Kidney Surgery     SURGICAL HISTORY OF -       Urethral implants     TONSILLECTOMY       Current Outpatient Medications   Medication Sig Dispense Refill     albuterol (PROAIR HFA/PROVENTIL HFA/VENTOLIN HFA) 108 (90 Base) MCG/ACT inhaler Inhale 2 puffs into the lungs every 4 hours as needed for shortness of breath / dyspnea 18 g 4     albuterol (PROVENTIL) (2.5 MG/3ML) 0.083% neb solution Take 1 vial (2.5 mg) by nebulization every 4 hours as needed for shortness of breath / dyspnea or wheezing 300 mL 3     budesonide (PULMICORT) 0.25 MG/2ML neb solution Take 2 mLs (0.25 mg) by nebulization daily For asthma 60 mL 12     buPROPion (WELLBUTRIN XL) 300 MG 24 hr tablet Take 1 tablet (300 mg) by mouth every morning 90 tablet 4     Cholecalciferol (VITAMIN D3) 01194 UNIT CAPS Take 2 capsules by mouth 2 times daily.       EPINEPHrine (ANY BX GENERIC EQUIV) 0.3 MG/0.3ML injection 2-pack Inject 0.3 mLs (0.3 mg) into the muscle as needed for anaphylaxis 2 each 0     fluticasone (FLOVENT HFA) 110 MCG/ACT inhaler Inhale 1 puff into the lungs 2 times daily 12 g 11     hydrOXYzine (ATARAX) 50 MG tablet TAKE 2 TABLETS BY MOUTH AT BEDTIME AS NEEDED FOR ITCHING 180 tablet 4     melatonin 5 MG tablet Take 5 mg by mouth At Bedtime        methocarbamol (ROBAXIN) 500 MG tablet Take 1 tablet (500 mg) by mouth 4 times daily as needed for muscle spasms 20 tablet 0     montelukast (SINGULAIR) 10 MG tablet Take 1 tablet (10 mg) by mouth At Bedtime 90 tablet 3     multivitamin w/minerals (MULTI-VITAMIN) tablet Take 1 tablet by mouth daily       Omega-3 Fatty Acids (OMEGA-3 FISH OIL PO)        omeprazole (PRILOSEC) 40 MG DR capsule Take 1 capsule (40 mg) by mouth daily 60 capsule 3     ondansetron (ZOFRAN) 4 MG tablet Take 1 tablet (4 mg) by mouth every 8 hours as needed for nausea 30 tablet 0     ORDER FOR DME Equipment being ordered: AFO for foot drop 1 each 0     TURMERIC PO        vitamin B complex with vitamin C (VITAMIN  B COMPLEX) tablet Take 1 tablet by mouth daily       folic acid (FOLVITE) 1 MG tablet Take 1 tablet (1 mg) by mouth daily (Patient not taking: Reported on 10/4/2021) 100 tablet 3     levonorgestrel (MIRENA) 20 MCG/24HR IUD 1 each by Intrauterine route once for 1 dose. 1 each 0     LORazepam (ATIVAN) 0.5 MG tablet Take 1-2 tablets (0.5-1 mg) by mouth every 6 hours as needed for anxiety (Patient not taking: Reported on 10/4/2021) 4 tablet 0       Allergies   Allergen Reactions     Adhesive Tape Other (See Comments)     Open sores.     Shellfish Allergy      Avocado Rash     Banana Rash     Kiwi, avocado     Latex Rash     Pcn [Penicillins] Rash     Plaquenil [Hydroxychloroquine] Rash     Sulfanilamide Rash        Social History     Tobacco Use     Smoking status: Former Smoker     Packs/day: 0.50     Years: 15.00     Pack years: 7.50     Types: Cigarettes     Quit date: 2014     Years since quittin.3     Smokeless tobacco: Never Used     Tobacco comment:  pack daily   Substance Use Topics     Alcohol use: No     Family History   Problem Relation Age of Onset     Gastrointestinal Disease Mother         chrohns     Diabetes Father      Cancer Father         had cancer  between lining of lungs     Cardiovascular Father 57         4 valves replaced     Lupus Father      Breast Cancer Maternal Grandmother      Respiratory Maternal Grandfather         COPD     Diabetes Paternal Grandmother      C.A.D. Paternal Grandmother      Asthma Paternal Grandmother      Diabetes Paternal Grandfather      Prostate Cancer Paternal Grandfather      Asthma Son      Asthma Daughter      History   Drug Use No         Objective     /76   Pulse 75   Temp 98.6  F (37  C) (Tympanic)   Resp 12   Wt 147 kg (324 lb)   SpO2 98%   BMI 55.61 kg/m      Physical Exam    GENERAL APPEARANCE: alert, active and no distress     EYES: EOMI, PERRL     HENT: ear canals and TM's normal and nose and mouth without ulcers or lesions     NECK: no adenopathy, no asymmetry, masses, or scars and thyroid normal to palpation     RESP: lungs clear to auscultation - no rales, rhonchi or wheezes     CV: regular rates and rhythm, normal S1 S2, no S3 or S4 and no murmur, click or rub     ABDOMEN:  soft, nontender, no HSM or masses and bowel sounds normal     MS: extremities normal- no gross deformities noted, no evidence of inflammation in joints, FROM in all extremities.     SKIN: no suspicious lesions or rashes     NEURO: Normal strength and tone, sensory exam grossly normal, mentation intact and speech normal     PSYCH: mentation appears normal. and affect normal/bright     LYMPHATICS: No cervical adenopathy    Recent Labs   Lab Test 08/06/21  0950 03/15/21  1200 03/15/20  1023   HGB 12.8 12.8 13.0    249 248   NA  --  139 143   POTASSIUM  --  4.1 4.2   CR 0.91 0.85 0.87        Diagnostics:  No labs were ordered during this visit.   No EKG required for low risk surgery (cataract, skin procedure, breast biopsy, etc).    Revised Cardiac Risk Index (RCRI):  The patient has the following serious cardiovascular risks for perioperative complications:   - No serious cardiac risks = 0 points     RCRI Interpretation: 0 points: Class I (very low risk - 0.4% complication  rate)           Signed Electronically by: Madan Mortensen MD  Copy of this evaluation report is provided to requesting physician.

## 2021-11-24 NOTE — NURSING NOTE
"Chief Complaint   Patient presents with     Pre-Op Exam     /76   Pulse 75   Temp 98.6  F (37  C) (Tympanic)   Resp 12   Wt 147 kg (324 lb)   SpO2 98%   BMI 55.61 kg/m   Estimated body mass index is 55.61 kg/m  as calculated from the following:    Height as of 8/6/21: 1.626 m (5' 4\").    Weight as of this encounter: 147 kg (324 lb).  Patient presents to the clinic using No DME      Health Maintenance that is potentially due pending provider review:    Health Maintenance Due   Topic Date Due     URINE DRUG SCREEN  Never done     ANNUAL REVIEW OF HM ORDERS  Never done     ADVANCE CARE PLANNING  Never done     PREVENTIVE CARE VISIT  06/09/2017     HPV TEST  06/09/2021     PAP  06/09/2021     COVID-19 Vaccine (3 - Booster for Moderna series) 08/02/2021     INFLUENZA VACCINE (1) 09/01/2021        n/a        "

## 2021-11-25 LAB — SARS-COV-2 RNA RESP QL NAA+PROBE: NEGATIVE

## 2022-02-01 ENCOUNTER — OFFICE VISIT (OUTPATIENT)
Dept: FAMILY MEDICINE | Facility: CLINIC | Age: 42
End: 2022-02-01
Payer: COMMERCIAL

## 2022-02-01 VITALS
TEMPERATURE: 97.6 F | SYSTOLIC BLOOD PRESSURE: 132 MMHG | BODY MASS INDEX: 50.02 KG/M2 | HEART RATE: 69 BPM | WEIGHT: 293 LBS | HEIGHT: 64 IN | DIASTOLIC BLOOD PRESSURE: 74 MMHG | RESPIRATION RATE: 16 BRPM | OXYGEN SATURATION: 100 %

## 2022-02-01 DIAGNOSIS — Z83.3 FAMILY HISTORY OF DIABETES MELLITUS: Primary | ICD-10-CM

## 2022-02-01 DIAGNOSIS — F31.9 BIPOLAR 1 DISORDER (H): ICD-10-CM

## 2022-02-01 DIAGNOSIS — G25.81 RESTLESS LEG SYNDROME: ICD-10-CM

## 2022-02-01 DIAGNOSIS — K22.70 BARRETT'S ESOPHAGUS DETERMINED BY ENDOSCOPY: ICD-10-CM

## 2022-02-01 DIAGNOSIS — E66.01 MORBID OBESITY (H): ICD-10-CM

## 2022-02-01 DIAGNOSIS — R73.03 PREDIABETES: ICD-10-CM

## 2022-02-01 DIAGNOSIS — R63.5 ABNORMAL WEIGHT GAIN: ICD-10-CM

## 2022-02-01 LAB
HBA1C MFR BLD: 5.7 % (ref 0–5.6)
HOLD SPECIMEN: NORMAL
HOLD SPECIMEN: NORMAL

## 2022-02-01 PROCEDURE — 99214 OFFICE O/P EST MOD 30 MIN: CPT | Performed by: NURSE PRACTITIONER

## 2022-02-01 PROCEDURE — 82607 VITAMIN B-12: CPT | Performed by: NURSE PRACTITIONER

## 2022-02-01 PROCEDURE — 80048 BASIC METABOLIC PNL TOTAL CA: CPT | Performed by: NURSE PRACTITIONER

## 2022-02-01 PROCEDURE — 83735 ASSAY OF MAGNESIUM: CPT | Performed by: NURSE PRACTITIONER

## 2022-02-01 PROCEDURE — 83550 IRON BINDING TEST: CPT | Performed by: NURSE PRACTITIONER

## 2022-02-01 PROCEDURE — 83036 HEMOGLOBIN GLYCOSYLATED A1C: CPT | Performed by: NURSE PRACTITIONER

## 2022-02-01 PROCEDURE — 84443 ASSAY THYROID STIM HORMONE: CPT | Performed by: NURSE PRACTITIONER

## 2022-02-01 PROCEDURE — 36415 COLL VENOUS BLD VENIPUNCTURE: CPT | Performed by: NURSE PRACTITIONER

## 2022-02-01 PROCEDURE — 82728 ASSAY OF FERRITIN: CPT | Performed by: NURSE PRACTITIONER

## 2022-02-01 PROCEDURE — 85027 COMPLETE CBC AUTOMATED: CPT | Performed by: NURSE PRACTITIONER

## 2022-02-01 RX ORDER — METFORMIN HYDROCHLORIDE 750 MG/1
750 TABLET, EXTENDED RELEASE ORAL
Qty: 90 TABLET | Refills: 1 | Status: SHIPPED | OUTPATIENT
Start: 2022-02-01 | End: 2023-09-14

## 2022-02-01 RX ORDER — GABAPENTIN 300 MG/1
CAPSULE ORAL
Qty: 90 CAPSULE | Refills: 0 | Status: SHIPPED | OUTPATIENT
Start: 2022-02-01 | End: 2022-10-17 | Stop reason: SINTOL

## 2022-02-01 ASSESSMENT — MIFFLIN-ST. JEOR: SCORE: 2114.19

## 2022-02-01 NOTE — PATIENT INSTRUCTIONS
Patient Education     Mediterranean Diet  A heart healthy eating plan  The Mediterranean Diet is based on the eating habits of people in countries near the [place]. People living in this part of the world have long lives and low rates of chronic diseases. They have lower rates of death from heart disease, cancer and other illnesses.  When you follow this eating plan you'll have better control of your blood sugar and weight. The plan requires simple changes in your habits of eating, and the whole family can take part.  Mediterranean lifestyle   Enjoy eating with others. Sit at the table with family and friends and enjoy your meal. When you eat slowly, you are able to tune in to your body's hunger and fullness signals. You're less likely to overeat.  Be physically active: Being active every day is important for overall good health. Run, walk, dance and do lighter activities such as house and yard work. Move more and sit less!  Drink plenty of water during the day.  Drink red wine with meals in modest amounts (optional).  The Mediterranean Pyramid   The pyramid shows the food groups and the amounts eaten in relation to the whole diet. It is more than a diet; it is also a life-style plan.  The largest food group at bottom contains plant foods: vegetables, fruits, grains, nuts, legumes, seeds, olives and olive oil. These foods make up the largest part of your meals.   The next groups above: fish and seafood, then poultry, cheese, eggs and yogurt are eaten less often and in smaller servings.   The top group, meats and sweets, are eaten the least often and in the smallest amounts.    Tips for adding plant foods to your meals   Vegetables and fruits:     Aim for 3 to 8 servings each day. A serving is   to 2 cups, depending on the food.    Choose a variety of colors. Big green salads are a great way to include several vegetable servings.    Try fresh fruit as a dessert: oranges, grapes, apples pomegranates or  "fresh figs.    At home keep fresh fruit in a bowl to tempt family.    Bring fruit and vegetables to work for a snack.  Oil     Replace butter and margarine with healthy fats such as olive oil. Other plant-based oils are canola, walnut and peanut oil. These are high in good fats. Use them in cooking, salad dressings and baking.    Drizzle your bread with olive oil instead of butter or margarine. Use herbs and spices to add flavor and aroma and to reduce fat and salt when cooking.  Whole grains    Look for the term \"whole\" or \"whole grain\" on the package. Processing grains removes vitamins, minerals and fiber. Whole grains may include: corn, wheat, oats, rye, rice and barley.    Examples of Mediterranean grains include: barley, farro, buckwheat, bulgur, couscous, and wheatberries.    Slowly switch to a whole grain by using whole-grain blends of pastas and rice. Or mix whole grains with refined; for example, mix whole-wheat pasta with white pasta.  Beans and legumes     Beans are a good source of protein and fiber, adding flavor and texture to dishes. Examples include cannellini beans, chickpea, serge beans, green beans, kidney beans, lentils and split peas.    Cook a vegetarian meal one night a week: use beans or legumes with vegetables and grains.    Nuts are high in healthy fats. Try walnuts, almonds, pine nuts, hazelnuts and cashews. Avoid candied, honey-roasted and heavily salted nuts.    Limit your intake of nuts to a small handful each day. Explore ways to add to nuts to salads and other dishes.  Tips for using fish and seafood in your meals    Aim for meals with fish or shellfish at least twice a week.    Tuna, herring, salmon and sardines are rich in heart-healthy omega-3. Shellfish, such as mussels, oysters and clams, have similar benefits for brain and heart health.  Tips for using poultry, eggs and cheese and yogurt in your meals    Eat poultry or eggs at least twice a week.    Roast, broil or grill your " poultry. Season with fresh or dried herbs.    Enjoy low-fat cheese or yogurt every day.  Tips for using red meat and sweets in your meals     Limit lean red meat to one time a week or 4 times a month.    Red meat has more saturated fats. Choose a lean cut, like top loin, sirloin, flank steak, strip steak or 90% lean ground beef.    Limit portions to 3 to 4 ounces.    Make whole grains and vegetables the main focus of a meal. Add meat in small amounts for flavor.    Limit sweets such as ice cream or cookies for a special times or holidays.  Snacks    Snack on a handful of almonds, walnuts or sunflower seeds in place of chips, cookies or other processed snack foods.    Calcium-rich, low-fat cheese or low- and nonfat plain yogurt with fresh fruit are healthy snacks that are easy to take with you.  Like to know more?  For tips on shoppping, cooking and eating well the Mediterranean way, go to the "[employer]" website at www.Domee.  For informational purposes only. Not to replace the advice of your health care provider.   Copyright   2014 [employer] [facility]. All rights reserved.   Mediterranean pyramrid used with permission of the [employer]. [employer] 313127 - 09/15.           Patient Education     Understanding the Mediterranean Diet  A Mediterranean-style diet is a healthy way of eating, not a specific diet to lose weight. It includes a lot of foods from plants such as vegetables, fruits, and whole grains, plus olive oil and seafood. It also includes dairy foods and meats, but in smaller amounts. And it includes a moderate amount of wine. Many studies over time have shown health benefits to eating this way. It focuses on making fresh food that s full of flavor.  This plan of eating is inspired by how people eat in countries around the [place]. They include [place], [place], [place], [place], [place], and [place]. But it uses foods you can buy in almost any grocery store.  Health benefits of a  Mediterranean diet  This diet is high in fiber, lean protein, and healthy oils. It s low in saturated fats and sugar. The diet has been shown to help prevent or manage:    Depression    Diabetes    Heart disease    High blood pressure    Parkinson disease    Alzheimer disease    Cancers of the colon, prostate, and breast  What do I eat on a Mediterranean diet?  Plan each meal around vegetables and whole grains. Use olive oil. Add nuts and legumes. Include fish or lean protein. Foods to focus your meals around include:  Food type What to eat   Vegetables This includes leafy greens, tomatoes, squash, peppers, cucumbers, green beans, eggplant, avocados, potatoes, and olives. You can use fresh or frozen vegetables.   Fruits This includes apples, raspberries, strawberries, grapes, citrus fruit such as oranges and grapefruit, stone fruit such as apricots and peaches, plus figs, dates, and melon.   Whole grains This includes brown rice, whole oats, quinoa, millet, whole grain bread, whole-wheat pasta, and crackers made with whole grains.   Beans and legumes These include lentils, chickpeas, and beans such as fleming, serge, kidney, and black beans. Peanuts are also legumes.   Seafood This includes fish such as salmon, trout, mackerel, brayan, tuna, sardines, anchovies, and whitefish. It also includes shellfish such as shrimp, oysters, mussels, and clams.   Nuts and seeds These include walnuts, almonds, sunflower seeds, cashews, brazil nuts, and pecans.   Healthy oil Olive oil is the most common oil in the Mediterranean diet. But other healthy oils are canola, sunflower, safflower, and corn oils.   Herbs and spices Season food with oregano, pepper, shannen, tarragon, thyme, basil, cinnamon, and cumin.   Wine Enjoy a glass of wine each day with a meal. Skip this if you need to not have alcohol for any reason.   Foods to eat in smaller amounts  You can add these foods in a few days a week, in smaller amounts:    Dairy foods, such  as cheese, yogurt, and butter    Poultry, such as chicken and duck    Eggs  Occasional treats  Limit these foods in your weekly eating plan:    Red meats, such as beef, lamb, and pork    Refined grains, such as white rice and foods made with white flour    Sugary treats, such as chocolate, candy, or pastries  Adding lots of flavor  You can liven up fresh foods with many kinds of flavor. Try these sauces, dips, and seasonings:    Kaylynnmus    Marinara sauce    Salsa    Vinaigrette dressing  Tips for eating out  At restaurants:    Skip fried foods. These have a lot of saturated fat.    Look for fish dishes that are cooked without cream or butter.    Pick salads that have nuts and seeds.    Choose vegetarian options that don t have too much cheese.  Jas last reviewed this educational content on 5/1/2020 2000-2021 [employer]. All rights reserved. This information is not intended as a substitute for professional medical care. Always follow your healthcare professional's instructions.

## 2022-02-01 NOTE — PROGRESS NOTES
"  Assessment & Plan     Family history of diabetes mellitus    - Hemoglobin A1c; Future  - Hemoglobin A1c  - Extra Tube; Future  - Extra Tube    Abnormal weight gain    - TSH with free T4 reflex; Future    Restless leg syndrome  Trial gabapentin  - Basic metabolic panel  (Ca, Cl, CO2, Creat, Gluc, K, Na, BUN); Future  - Magnesium; Future  - Ferritin; Future  - CBC with platelets; Future  - Iron and iron binding capacity; Future  - gabapentin (NEURONTIN) 300 MG capsule; Take 1 cap by mouth 1-2 hours before bed, may add a morning dose after 3 days, them may add a afternoon dose after another 3 days.  - Vitamin B12; Future    Prediabetes    - metFORMIN (GLUCOPHAGE-XR) 750 MG 24 hr tablet; Take 1 tablet (750 mg) by mouth daily (with dinner)  - **A1C FUTURE 3mo; Future  - Vitamin B12; Future    Bipolar 1  - follows with therapist, stable mood    Morbid Obesity  - currently being worked up for gastric sleeve revision             BMI:   Estimated body mass index is 55.79 kg/m  as calculated from the following:    Height as of this encounter: 1.626 m (5' 4\").    Weight as of this encounter: 147.4 kg (325 lb).   Weight management plan: Discussed healthy diet and exercise guidelines    FUTURE APPOINTMENTS:       - Follow-up for annual visit or as needed- message me in a few weeks on gabapentin   Work on weight loss  Regular exercise  See Patient Instructions    No follow-ups on file.    DELANO Lin CNP  [facility]    Sulaiman Thompson is a 41 year old who presents for the following health issues     HPI     Concern - Diabetes   Onset: 3 weeks   Description: Patient says she has a strong family hx of diabetes. Couple weeks ago she noticed her breath, urine, and sweat had a sweet odor. Checked her blood sugar a week and a half ago and it was at 273.  Intensity: moderate  Progression of Symptoms:  same  Accompanying Signs & Symptoms: weakness, shakiness, headaches, excessive thirst, " "restless legs - worse at night, can happen during the day also  Previous history of similar problem: none   Precipitating factors:        Worsened by: none   Alleviating factors:        Improved by: none   Therapies tried and outcome: None    Hx of chronic pain, severe gerd, ptsd, asthma. All stable. Pain does continue, especially in legs.    Review of Systems   Constitutional, HEENT, cardiovascular, pulmonary, gi and gu systems are negative, except as otherwise noted.      Objective    /74   Pulse 69   Temp 97.6  F (36.4  C) (Tympanic)   Resp 16   Ht 1.626 m (5' 4\")   Wt 147.4 kg (325 lb)   SpO2 100%   BMI 55.79 kg/m    Body mass index is 55.79 kg/m .  Physical Exam   GENERAL: alert and no distress  RESP: no rhonchi and no wheezes  CV: regular rates and rhythm, normal S1 S2, no S3 or S4 and no murmur, click or rub  MS: no gross musculoskeletal defects noted, no edema  NEURO: Normal strength and tone, mentation intact and speech normal  PSYCH: mentation appears normal, affect normal/bright    Results for orders placed or performed in visit on 02/01/22 (from the past 24 hour(s))   Hemoglobin A1c   Result Value Ref Range    Hemoglobin A1C 5.7 (H) 0.0 - 5.6 %   Extra Tube    Narrative    The following orders were created for panel order Extra Tube.  Procedure                               Abnormality         Status                     ---------                               -----------         ------                     Extra Red Top Tube[[id number]]                               In process                 Extra Green Top (Lithium...[[id number]]                      In process                   Please view results for these tests on the individual orders.               "

## 2022-02-02 LAB
ANION GAP SERPL CALCULATED.3IONS-SCNC: 6 MMOL/L (ref 3–14)
BUN SERPL-MCNC: 17 MG/DL (ref 7–30)
CALCIUM SERPL-MCNC: 8.9 MG/DL (ref 8.5–10.1)
CHLORIDE BLD-SCNC: 106 MMOL/L (ref 94–109)
CO2 SERPL-SCNC: 28 MMOL/L (ref 20–32)
CREAT SERPL-MCNC: 0.83 MG/DL (ref 0.52–1.04)
ERYTHROCYTE [DISTWIDTH] IN BLOOD BY AUTOMATED COUNT: 13.5 % (ref 10–15)
FERRITIN SERPL-MCNC: 41 NG/ML (ref 12–150)
GFR SERPL CREATININE-BSD FRML MDRD: 89 ML/MIN/1.73M2
GLUCOSE BLD-MCNC: 90 MG/DL (ref 70–99)
HCT VFR BLD AUTO: 41.8 % (ref 35–47)
HGB BLD-MCNC: 13.6 G/DL (ref 11.7–15.7)
IRON SATN MFR SERPL: 11 % (ref 15–46)
IRON SERPL-MCNC: 40 UG/DL (ref 35–180)
MAGNESIUM SERPL-MCNC: 2.3 MG/DL (ref 1.6–2.3)
MCH RBC QN AUTO: 27.9 PG (ref 26.5–33)
MCHC RBC AUTO-ENTMCNC: 32.5 G/DL (ref 31.5–36.5)
MCV RBC AUTO: 86 FL (ref 78–100)
PLATELET # BLD AUTO: 287 10E3/UL (ref 150–450)
POTASSIUM BLD-SCNC: 4.9 MMOL/L (ref 3.4–5.3)
RBC # BLD AUTO: 4.87 10E6/UL (ref 3.8–5.2)
SODIUM SERPL-SCNC: 140 MMOL/L (ref 133–144)
TIBC SERPL-MCNC: 370 UG/DL (ref 240–430)
TSH SERPL DL<=0.005 MIU/L-ACNC: 0.89 MU/L (ref 0.4–4)
VIT B12 SERPL-MCNC: 929 PG/ML (ref 193–986)
WBC # BLD AUTO: 8.6 10E3/UL (ref 4–11)

## 2022-02-02 NOTE — RESULT ENCOUNTER NOTE
Jackie Marquez    Your lab results came back within normal limits with the exception of the A1C we talked about. Still waiting for the B12 level. Please let us know if you have any questions.     Take care,    DELANO Leal CNP

## 2022-02-03 NOTE — RESULT ENCOUNTER NOTE
Jackie Marquez    Your B12 is also normal. Hopefully the Gabapentin will be helpful. Please let us know if you have any questions.     Take care,    DELANO Leal CNP

## 2022-02-22 ENCOUNTER — MYC MEDICAL ADVICE (OUTPATIENT)
Dept: FAMILY MEDICINE | Facility: CLINIC | Age: 42
End: 2022-02-22
Payer: COMMERCIAL

## 2022-02-23 NOTE — TELEPHONE ENCOUNTER
Left a message for Donan to call us back.     Patient is on the schedule to see Randi on Friday for an IUD replacement - she does not do these.   Patient will need to reschedule with ,  or  to get this done.      Informed her in the voicemail to either call us back or respond to her [employer] message that Randi sent yesterday.       Gila Antonio, MCKENZIE 2/23/2022 1:21 PM

## 2022-02-27 ENCOUNTER — HEALTH MAINTENANCE LETTER (OUTPATIENT)
Age: 42
End: 2022-02-27

## 2022-06-07 DIAGNOSIS — J45.20 INTERMITTENT ASTHMA, UNSPECIFIED ASTHMA SEVERITY, UNSPECIFIED WHETHER COMPLICATED: ICD-10-CM

## 2022-06-07 NOTE — TELEPHONE ENCOUNTER
"Requested Prescriptions   Pending Prescriptions Disp Refills     albuterol (PROAIR HFA/PROVENTIL HFA/VENTOLIN HFA) 108 (90 Base) MCG/ACT inhaler 18 g 4     Sig: Inhale 2 puffs into the lungs every 4 hours as needed for shortness of breath / dyspnea       Asthma Maintenance Inhalers - Anticholinergics Failed - 6/7/2022  9:55 AM        Failed - Asthma control assessment score within normal limits in last 6 months     Please review ACT score.           Passed - Patient is age 12 years or older        Passed - Medication is active on med list        Passed - Recent (6 mo) or future (30 days) visit within the authorizing provider's specialty     Patient had office visit in the last 6 months or has a visit in the next 30 days with authorizing provider or within the authorizing provider's specialty.  See \"Patient Info\" tab in inbasket, or \"Choose Columns\" in Meds & Orders section of the refill encounter.           Short-Acting Beta Agonist Inhalers Protocol  Failed - 6/7/2022  9:55 AM        Failed - Asthma control assessment score within normal limits in last 6 months     Please review ACT score.           Passed - Patient is age 12 or older        Passed - Medication is active on med list        Passed - Recent (6 mo) or future (30 days) visit within the authorizing provider's specialty     Patient had office visit in the last 6 months or has a visit in the next 30 days with authorizing provider or within the authorizing provider's specialty.  See \"Patient Info\" tab in inbasket, or \"Choose Columns\" in Meds & Orders section of the refill encounter.                 "

## 2022-06-08 RX ORDER — ALBUTEROL SULFATE 90 UG/1
2 AEROSOL, METERED RESPIRATORY (INHALATION) EVERY 4 HOURS PRN
Qty: 18 G | Refills: 4 | Status: SHIPPED | OUTPATIENT
Start: 2022-06-08 | End: 2023-02-14

## 2022-06-17 ENCOUNTER — ALLIED HEALTH/NURSE VISIT (OUTPATIENT)
Dept: FAMILY MEDICINE | Facility: CLINIC | Age: 42
End: 2022-06-17
Payer: COMMERCIAL

## 2022-06-17 DIAGNOSIS — Z11.1 SCREENING EXAMINATION FOR PULMONARY TUBERCULOSIS: Primary | ICD-10-CM

## 2022-06-17 PROCEDURE — 86580 TB INTRADERMAL TEST: CPT

## 2022-06-17 PROCEDURE — 99207 PR NO CHARGE NURSE ONLY: CPT

## 2022-06-17 NOTE — PROGRESS NOTES
Patient is here today for a Mantoux (TST) test placement.    Is there a current order in the chart? Yes    Reason for Mantoux (TST) in patient's own words: needs for work    Patient needs form signed? Yes- completed per clinic's forms process.    Instructed patient to wait for 15 minutes post injection and to report any reactions immediately to staff.    Told patient to return to clinic in 48-72 hours to have Mantoux (TST) read.

## 2022-06-20 ENCOUNTER — ALLIED HEALTH/NURSE VISIT (OUTPATIENT)
Dept: FAMILY MEDICINE | Facility: CLINIC | Age: 42
End: 2022-06-20
Payer: COMMERCIAL

## 2022-06-20 DIAGNOSIS — Z11.1 SCREENING EXAMINATION FOR PULMONARY TUBERCULOSIS: Primary | ICD-10-CM

## 2022-06-20 LAB
PPDINDURATION: 0 MM (ref 0–4.99)
PPDREDNESS: NORMAL

## 2022-06-20 PROCEDURE — 99207 PR NO CHARGE NURSE ONLY: CPT

## 2022-06-20 NOTE — PROGRESS NOTES
Patient is here today for a Mantoux (TST) test results.    Did patient return to clinic 48-72 hours from Mantoux (TST) placement:   Yes -     PPD Induration   Date Value Ref Range Status   06/20/2022 0 0 - 4.99 mm Final     PPD Redness   Date Value Ref Range Status   06/20/2022 Not Present  Final       Induration Size? Induration <5mm - Enter results in Enter/Edit Activity. Route results to ordering provider.     Patient needs form signed? Yes. Follow clinic form process.     Patient reports having previously had the BCG Vaccine: No    Does patient need a two step? No     SWATHI GodfreyN, RN, PHN

## 2022-06-21 ENCOUNTER — TELEPHONE (OUTPATIENT)
Dept: FAMILY MEDICINE | Facility: CLINIC | Age: 42
End: 2022-06-21
Payer: COMMERCIAL

## 2022-06-21 NOTE — LETTER
[facility]  [address]  [facility] [address]  [phone]      June 21, 2022      Alma Quinn  [address]  [facility] [address]      Your healthcare team cares about your health. To provide you with the best care,   we have reviewed your chart and based on our findings, we see that you are due to:     - CERVICAL CANCER SCREENING: Schedule a Cervical Cancer Screening, with Pap and wellness exam.     If you have already completed these items, please contact the clinic via phone or   [employer] so your care team can review and update your records. Thank you for   choosing [facility] for your healthcare needs. For any questions,   concerns, or to schedule an appointment please contact the clinic.       Healthy Regards,      Your [facility] Care Team

## 2022-06-21 NOTE — TELEPHONE ENCOUNTER
Patient Quality Outreach    Patient is due for the following:   Asthma  -  ACT needed  Cervical Cancer Screening - PAP Needed  Physical  - 6/9/2017    NEXT STEPS:   Schedule a yearly physical    Type of outreach:    Sent letter.    Next Steps:  Reach out within 90 days via Letter.    Max number of attempts reached: No. Will try again in 90 days if patient still on fail list.    Questions for provider review:    None     Michaela Padilla, CMA

## 2022-07-01 ENCOUNTER — OFFICE VISIT (OUTPATIENT)
Dept: FAMILY MEDICINE | Facility: CLINIC | Age: 42
End: 2022-07-01
Payer: COMMERCIAL

## 2022-07-01 VITALS
SYSTOLIC BLOOD PRESSURE: 130 MMHG | DIASTOLIC BLOOD PRESSURE: 72 MMHG | HEART RATE: 73 BPM | WEIGHT: 293 LBS | RESPIRATION RATE: 16 BRPM | OXYGEN SATURATION: 98 % | BODY MASS INDEX: 50.02 KG/M2 | TEMPERATURE: 97.7 F | HEIGHT: 64 IN

## 2022-07-01 DIAGNOSIS — G47.33 OSA (OBSTRUCTIVE SLEEP APNEA): ICD-10-CM

## 2022-07-01 DIAGNOSIS — Z11.52 ENCOUNTER FOR PREOPERATIVE SCREENING LABORATORY TESTING FOR COVID-19 VIRUS: ICD-10-CM

## 2022-07-01 DIAGNOSIS — Z01.812 ENCOUNTER FOR PREOPERATIVE SCREENING LABORATORY TESTING FOR COVID-19 VIRUS: ICD-10-CM

## 2022-07-01 DIAGNOSIS — K21.9 GASTROESOPHAGEAL REFLUX DISEASE, UNSPECIFIED WHETHER ESOPHAGITIS PRESENT: ICD-10-CM

## 2022-07-01 DIAGNOSIS — E66.01 MORBID OBESITY (H): ICD-10-CM

## 2022-07-01 DIAGNOSIS — Z01.818 PREOP GENERAL PHYSICAL EXAM: Primary | ICD-10-CM

## 2022-07-01 PROCEDURE — 99214 OFFICE O/P EST MOD 30 MIN: CPT | Performed by: FAMILY MEDICINE

## 2022-07-01 RX ORDER — OMEPRAZOLE 40 MG/1
40 CAPSULE, DELAYED RELEASE ORAL DAILY
Qty: 90 CAPSULE | Refills: 3 | Status: SHIPPED | OUTPATIENT
Start: 2022-07-01 | End: 2022-07-12

## 2022-07-01 ASSESSMENT — PAIN SCALES - GENERAL: PAINLEVEL: MODERATE PAIN (5)

## 2022-07-01 NOTE — PROGRESS NOTES
[facility]  [address] [facility] [address]  Phone: [phone]  Primary Provider: Tomeka Rajput  Pre-op Performing Provider: TMOEKA RAJPUT      PREOPERATIVE EVALUATION:  Today's date: 7/1/2022    Alma Quinn is a 42 year old female who presents for a preoperative evaluation.    Surgical Information:Surgery/Procedure: LAPAROSCOPIC CONVERSION OF SLEEVE GASTRECTOMY TO  BYPASS GASTRIC YUE-N-Y  Surgery Location: [facility]  Surgeon: Eryn  Surgery Date: 7/7/2022  Time of Surgery: TBD  Where patient plans to recover: At home with family  Fax number for surgical facility: [phone]    Type of Anesthesia Anticipated: General    Assessment & Plan     The proposed surgical procedure is considered INTERMEDIATE risk.    Preop general physical exam     - Asymptomatic COVID-19 Virus (Coronavirus) by PCR; Future    Morbid obesity (H)     - Asymptomatic COVID-19 Virus (Coronavirus) by PCR; Future    Gastroesophageal reflux disease, unspecified whether esophagitis present     - omeprazole (PRILOSEC) 40 MG DR capsule; Take 1 capsule (40 mg) by mouth daily    Encounter for preoperative screening laboratory testing for COVID-19 virus     - Asymptomatic COVID-19 Virus (Coronavirus) by PCR; Future    SAMEER (obstructive sleep apnea)              Risks and Recommendations:  The patient has the following additional risks and recommendations for perioperative complications:   - Consult Hospitalist / IM to assist with post-op medical management   - Morbid obesity (BMI >40)  Obstructive Sleep Apnea: does not use CPAP       Medication Instructions:  Patient is to take all scheduled medications on the day of surgery EXCEPT for modifications listed below:   - metformin: HOLD day of surgery.   - SSRIs, SNRIs, TCAs, Antipsychotics: Continue without modification.     RECOMMENDATION:  APPROVAL GIVEN to proceed with proposed procedure, without further diagnostic  evaluation.                      Subjective     HPI related to upcoming procedure:     40 yo female with history of gastric sleeve in 2012.  Initial weight 415 lbs and lost down to 225 lbs.  Has started to regain weight and has had increased reflux as well causing dental problems.  Uses TUMS regularly.     Has SAMEER as well.    Scheduled for conversion of the sleeve gastrectomy to anne-en-y bypass.       Preop Questions 7/1/2022   1. Have you ever had a heart attack or stroke? No   2. Have you ever had surgery on your heart or blood vessels, such as a stent placement, a coronary artery bypass, or surgery on an artery in your head, neck, heart, or legs? No   3. Do you have chest pain with activity? No   4. Do you have a history of  heart failure? No   5. Do you currently have a cold, bronchitis or symptoms of other infection? No   6. Do you have a cough, shortness of breath, or wheezing? YES - asthma, well controlled   7. Do you or anyone in your family have previous history of blood clots? YES - father   8. Do you or does anyone in your family have a serious bleeding problem such as prolonged bleeding following surgeries or cuts? No   9. Have you ever had problems with anemia or been told to take iron pills? YES - last hemoglobin normal.     10. Have you had any abnormal blood loss such as black, tarry or bloody stools, or abnormal vaginal bleeding? No   11. Have you ever had a blood transfusion? No   12. Are you willing to have a blood transfusion if it is medically needed before, during, or after your surgery? Yes   13. Have you or any of your relatives ever had problems with anesthesia? YES - hard to wake up   14. Do you have sleep apnea, excessive snoring or daytime drowsiness? No   15. Do you have any artifical heart valves or other implanted medical devices like a pacemaker, defibrillator, or continuous glucose monitor? No   16. Do you have artificial joints? No   17. Are you allergic to latex? YES:     18. Is  there any chance that you may be pregnant? No     Health Care Directive:  Patient does not have a Health Care Directive or Living Will: Discussed advance care planning with patient; however, patient declined at this time.    Preoperative Review of :   reviewed - no record of controlled substances prescribed.      Status of Chronic Conditions:  See problem list for active medical problems.  Problems all longstanding and stable, except as noted/documented.  See ROS for pertinent symptoms related to these conditions.      Review of Systems  CONSTITUTIONAL: NEGATIVE for fever, chills, change in weight  ENT/MOUTH: NEGATIVE for ear, mouth and throat problems  RESP: NEGATIVE for significant cough or SOB  CV: NEGATIVE for chest pain, palpitations or peripheral edema    Patient Active Problem List    Diagnosis Date Noted     Bipolar 1 disorder (H) 04/13/2011     Priority: High     Sees psychiatric nurse, Margaux Azul NP with [facility]       Encinas's esophagus determined by endoscopy 02/01/2022     Priority: Medium     Morbid obesity (H) 01/24/2020     Priority: Medium     GERD (gastroesophageal reflux disease) 10/19/2012     Priority: Medium     S/P gastrectomy 07/11/2012     Priority: Medium     Overview:   Lap Sleeve Gastrectomy by Dr. Ephraim Blood from bariatric surgery. DOS 7/11/2012 at [facility], [place], [place]       Lumbar radiculopathy 11/04/2011     Priority: Medium     Acute bilateral leg weakness 2/11 and found to have L5-S1 herniated disk with compression of left L5 nerve root.    S/p left L5-S1 hemilaminectomy and microdiscectomy 2/24/11.  Continues to have residual left leg weakness and pain       Weakness of left leg 11/04/2011     Priority: Medium     Fibromyalgia 05/23/2011     Priority: Medium     DDD (degenerative disc disease), lumbar 04/13/2011     Priority: Medium     MRI 2/11- L5-S1 large central posterior extruded disc herniation extending inferior to the disc  level, causing slight impression on  thecal sac and left S1 nerve root sleeve. S/p Left L5-S1 hemilaminectomy discectomy.        SAMEER (obstructive sleep apnea) 06/17/2010     Priority: Medium     Not tolerating CPAP, has deviated septum - referred to ENT in 12/09 and prescribed flonase       Seasonal allergic rhinitis 06/17/2010     Priority: Medium     Intermittent asthma 06/17/2010     Priority: Medium     Uses inhaled steroid in fall, typically       Chronic pain disorder 06/17/2010     Priority: Medium     Started in 2008 3 months postpartum.  Pain in her entire body with muscle cramping- arms, legs, elbows, knees, ankles, hips, shoulders, fingers etc.  Has been on multiple meds icluding gabapentin, cymbalta, NSAIDs, flexeril and narcotics with minimal benefit.  High doses of prednisone helped some.  Insurance would not cover lyrica.  Mild elevation of ESR to ~30 and CRP to ~24.  RF was 14 (normal 0-14).  CCP was negative  MRI 4/09:  Degenerative changes at L5-S1, mod to mild corby foraminal narrowing with mild facet arthropathy, no nerve impingement  Negative ehrlichia and lyme testing in 5/09  Mildly decreased vitamin D level at 20 in 10/08 - corrected to 25 in 1/09, then put on 50,000 units/week  Vitamin D level 5/10 was 16  ? Fibromyalgia vs chronic pain syndrome vs vitamin D deficiency?  Normal hand and foot xrays 7/09  Seen by Dr. Gildardo Villeda, rheumatology 6/10 - lab w/u and exam negative for RA.  Vitamin D levels low - recommended vitamin D3 4000 units/day x 8 weeks, then 2000 units/day.  Also referral to PT for iliotibial band syndrome and corby knee pain.  Also referral to rheumatology nursing associates for fibromyalgia.  Also emphasized need to treat SAMEER and possible bariatric surgery.       PCOS (polycystic ovarian syndrome) 06/17/2010     Priority: Medium     Generalized osteoarthrosis, involving multiple sites 05/07/2010     Priority: Medium     Bipolar I disorder (H) 05/07/2010     Priority: Medium      Status post bariatric surgery 2009     Priority: Medium     Posttraumatic stress disorder 2008     Priority: Medium     CARDIOVASCULAR SCREENING; LDL GOAL LESS THAN 160 10/31/2010     Priority: Low      Past Medical History:   Diagnosis Date     Cystic kidney disease      Depression      PONV (postoperative nausea and vomiting)      Post traumatic stress disorder      Seasonal allergies      Toxemia      Uncomplicated asthma      Past Surgical History:   Procedure Laterality Date     ADENOIDECTOMY       APPENDECTOMY       ARTHROSCOPY KNEE WITH MEDIAL MENISCECTOMY  2014    Procedure: ARTHROSCOPY KNEE WITH MEDIAL MENISCECTOMY;  Surgeon: Arron Curtis MD;  Location: [facility]     ARTHROSCOPY KNEE WITH MEDIAL MENISCECTOMY Left 2015    Procedure: ARTHROSCOPY KNEE WITH MEDIAL MENISCECTOMY;  Surgeon: Arron Curtis MD;  Location: [facility] OR     C/SECTION, CLASSICAL      , Classical     CHOLECYSTECTOMY, LAPOROSCOPIC  10/07    Cholecystectomy, Laparoscopic     HEMILAMINECTOMY, DISCECTOMY LUMBAR TWO LEVELS, COMBINED  11    Left L5-S1 hemilaminectomy for discectomy      LAPAROSCOPIC GASTRIC SLEEVE  12    lap sleeve gastrectomy     SURGICAL HISTORY OF -       Kidney Surgery     SURGICAL HISTORY OF -       Urethral implants     TONSILLECTOMY       Current Outpatient Medications   Medication Sig Dispense Refill     albuterol (PROAIR HFA/PROVENTIL HFA/VENTOLIN HFA) 108 (90 Base) MCG/ACT inhaler Inhale 2 puffs into the lungs every 4 hours as needed for shortness of breath / dyspnea 18 g 4     albuterol (PROVENTIL) (2.5 MG/3ML) 0.083% neb solution Take 1 vial (2.5 mg) by nebulization every 4 hours as needed for shortness of breath / dyspnea or wheezing 300 mL 3     budesonide (PULMICORT) 0.25 MG/2ML neb solution Take 2 mLs (0.25 mg) by nebulization daily For asthma 60 mL 12     buPROPion (WELLBUTRIN XL) 300 MG 24 hr tablet Take 1 tablet (300 mg) by mouth every morning 90 tablet  4     Cholecalciferol (VITAMIN D3) 41869 UNIT CAPS Take 2 capsules by mouth 2 times daily.       EPINEPHrine (ANY BX GENERIC EQUIV) 0.3 MG/0.3ML injection 2-pack Inject 0.3 mLs (0.3 mg) into the muscle as needed for anaphylaxis 2 each 0     fluticasone (FLOVENT HFA) 110 MCG/ACT inhaler Inhale 1 puff into the lungs 2 times daily 12 g 11     gabapentin (NEURONTIN) 300 MG capsule Take 1 cap by mouth 1-2 hours before bed, may add a morning dose after 3 days, them may add a afternoon dose after another 3 days. 90 capsule 0     hydrOXYzine (ATARAX) 50 MG tablet TAKE 2 TABLETS BY MOUTH AT BEDTIME AS NEEDED FOR ITCHING 180 tablet 4     levonorgestrel (MIRENA) 20 MCG/24HR IUD 1 each by Intrauterine route once for 1 dose. 1 each 0     melatonin 5 MG tablet Take 5 mg by mouth At Bedtime       metFORMIN (GLUCOPHAGE-XR) 750 MG 24 hr tablet Take 1 tablet (750 mg) by mouth daily (with dinner) 90 tablet 1     methocarbamol (ROBAXIN) 500 MG tablet Take 1 tablet (500 mg) by mouth 4 times daily as needed for muscle spasms 20 tablet 0     montelukast (SINGULAIR) 10 MG tablet Take 1 tablet (10 mg) by mouth At Bedtime 90 tablet 3     multivitamin w/minerals (MULTI-VITAMIN) tablet Take 1 tablet by mouth daily       omeprazole (PRILOSEC) 40 MG DR capsule Take 1 capsule (40 mg) by mouth daily 90 capsule 3     ondansetron (ZOFRAN) 4 MG tablet Take 1 tablet (4 mg) by mouth every 8 hours as needed for nausea 30 tablet 0     ORDER FOR DME Equipment being ordered: AFO for foot drop 1 each 0     TURMERIC PO        vitamin B complex with vitamin C (VITAMIN  B COMPLEX) tablet Take 1 tablet by mouth daily       Omega-3 Fatty Acids (OMEGA-3 FISH OIL PO)          Allergies   Allergen Reactions     Adhesive Tape Other (See Comments)     Open sores.     Shellfish Allergy      Avocado Rash     Banana Rash     Kiwi, avocado     Latex Rash     Pcn [Penicillins] Rash     Plaquenil [Hydroxychloroquine] Rash     Sulfanilamide Rash        Social History  "    Tobacco Use     Smoking status: Former Smoker     Packs/day: 0.50     Years: 15.00     Pack years: 7.50     Types: Cigarettes     Quit date: 2014     Years since quittin.9     Smokeless tobacco: Never Used     Tobacco comment: 1/4 pack daily   Substance Use Topics     Alcohol use: No     Family History   Problem Relation Age of Onset     Gastrointestinal Disease Mother         chrohns     Diabetes Father      Cancer Father         had cancer  between lining of lungs     Cardiovascular Father 57        4 valves replaced     Lupus Father      Breast Cancer Maternal Grandmother      Respiratory Maternal Grandfather         COPD     Diabetes Paternal Grandmother      C.A.D. Paternal Grandmother      Asthma Paternal Grandmother      Diabetes Paternal Grandfather      Prostate Cancer Paternal Grandfather      Asthma Son      Asthma Daughter      History   Drug Use No         Objective     /72   Pulse 73   Temp 97.7  F (36.5  C) (Tympanic)   Resp 16   Ht 1.626 m (5' 4\")   Wt 144.2 kg (318 lb)   SpO2 98%   Breastfeeding No   BMI 54.58 kg/m      Physical Exam  GENERAL APPEARANCE: healthy, alert and no distress  HENT: ear canals and TM's normal and nose and mouth without ulcers or lesions  RESP: lungs clear to auscultation - no rales, rhonchi or wheezes  CV: regular rate and rhythm, normal S1 S2, no S3 or S4 and no murmur, click or rub   ABDOMEN: soft, nontender, no HSM or masses and bowel sounds normal  NEURO: Normal strength and tone, sensory exam grossly normal, mentation intact and speech normal    Recent Labs   Lab Test 22  1630 22  1624 21  0950 03/15/21  1200   HGB 13.6  --  12.8 12.8     --  269 249     --   --  139   POTASSIUM 4.9  --   --  4.1   CR 0.83  --  0.91 0.85   A1C  --  5.7*  --   --       Labs done in February - reviewed    Diagnostics:  No labs were ordered during this visit.   No EKG required, no history of coronary heart disease, significant " arrhythmia, peripheral arterial disease or other structural heart disease.    Revised Cardiac Risk Index (RCRI):  The patient has the following serious cardiovascular risks for perioperative complications:   - No serious cardiac risks = 0 points     RCRI Interpretation: 0 points: Class I (very low risk - 0.4% complication rate)           Signed Electronically by: Tomeka Leger MD  Copy of this evaluation report is provided to requesting physician.

## 2022-07-01 NOTE — PATIENT INSTRUCTIONS
Our Clinic hours are:      7:20 am - 7 pm    7:20 am - 5 pm    Clinic Phone: [phone]    The clinic lab opens at 7:30 am Mon - Fri and appointments are required.    [facility]. [phone]  Monday  8 am - 7pm   8 am - 5:30 pm       Preparing for Your Surgery  Getting started  A nurse will call you to review your health history and instructions. They will give you an arrival time based on your scheduled surgery time. Please be ready to share:  Your doctor's clinic name and phone number  Your medical, surgical and anesthesia history  A list of allergies and sensitivities  A list of medicines, including herbal treatments and over-the-counter drugs  Whether the patient has a legal guardian (ask how to send us the papers in advance)  Please tell us if you're pregnant--or if there's any chance you might be pregnant. Some surgeries may injure a fetus (unborn baby), so they require a pregnancy test. Surgeries that are safe for a fetus don't always need a test, and you can choose whether to have one.   If you have a child who's having surgery, please ask for a copy of Preparing for Your Child's Surgery.    Preparing for surgery  Within 30 days of surgery: Have a pre-op exam (sometimes called an H&P, or History and Physical). This can be done at a clinic or pre-operative center.  If you're having a , you may not need this exam. Talk to your care team.  At your pre-op exam, talk to your care team about all medicines you take. If you need to stop any medicines before surgery, ask when to start taking them again.  We do this for your safety. Many medicines can make you bleed too much during surgery. Some change how well surgery (anesthesia) drugs work.  Call your insurance company to let them know you're having surgery. (If you don't have insurance, call [phone].)  Call your clinic if there's any change in your health. This includes signs of a cold or flu  (sore throat, runny nose, cough, rash, fever). It also includes a scrape or scratch near the surgery site.  If you have questions on the day of surgery, call your hospital or surgery center.  COVID testing  You may need to be tested for COVID-19 before having surgery. If so, we will give you instructions.  Eating and drinking guidelines  For your safety: Unless your surgeon tells you otherwise, follow the guidelines below.  Eat and drink as usual until 8 hours before surgery. After that, no food or milk.  Drink clear liquids until 2 hours before surgery. These are liquids you can see through, like water, Gatorade and Propel Water. You may also have black coffee and tea (no cream or milk).  Nothing by mouth within 2 hours of surgery. This includes gum, candy and breath mints.  If you drink alcohol: Stop drinking it the night before surgery.  If your care team tells you to take medicine on the morning of surgery, it's okay to take it with a sip of water.  Preventing infection  Shower or bathe the night before and morning of your surgery. Follow the instructions your clinic gave you. (If no instructions, use regular soap.)  Don't shave or clip hair near your surgery site. We'll remove the hair if needed.  Don't smoke or vape the morning of surgery. You may chew nicotine gum up to 2 hours before surgery. A nicotine patch is okay.  Note: Some surgeries require you to completely quit smoking and nicotine. Check with your surgeon.  Your care team will make every effort to keep you safe from infection. We will:  Clean our hands often with soap and water (or an alcohol-based hand rub).  Clean the skin at your surgery site with a special soap that kills germs.  Give you a special gown to keep you warm. (Cold raises the risk of infection.)  Wear special hair covers, masks, gowns and gloves during surgery.  Give antibiotic medicine, if prescribed. Not all surgeries need antibiotics.  What to bring on the day of surgery  Photo  ID and insurance card  Copy of your health care directive, if you have one  Glasses and hearing aides (bring cases)  You can't wear contacts during surgery  Inhaler and eye drops, if you use them (tell us about these when you arrive)  CPAP machine or breathing device, if you use them  A few personal items, if spending the night  If you have . . .  A pacemaker, ICD (cardiac defibrillator) or other implant: Bring the ID card.  An implanted stimulator: Bring the remote control.  A legal guardian: Bring a copy of the certified (court-stamped) guardianship papers.  Please remove any jewelry, including body piercings. Leave jewelry and other valuables at home.  If you're going home the day of surgery  You must have a responsible adult drive you home. They should stay with you overnight as well.  If you don't have someone to stay with you, and you aren't safe to go home alone, we may keep you overnight. Insurance often won't pay for this.  After surgery  If it's hard to control your pain or you need more pain medicine, please call your surgeon's office.  Questions?   If you have any questions for your care team, list them here: _________________________________________________________________________________________________________________________________________________________________________ ____________________________________ ____________________________________ ____________________________________  For informational purposes only. Not to replace the advice of your health care provider. Copyright   2003, 2019 [facility]. All rights reserved. Clinically reviewed by Radha Martinez MD. netprice.com 200451 - REV 07/21.

## 2022-07-03 ENCOUNTER — LAB (OUTPATIENT)
Dept: FAMILY MEDICINE | Facility: CLINIC | Age: 42
End: 2022-07-03
Attending: FAMILY MEDICINE
Payer: COMMERCIAL

## 2022-07-03 DIAGNOSIS — E66.01 MORBID OBESITY (H): ICD-10-CM

## 2022-07-03 DIAGNOSIS — Z11.52 ENCOUNTER FOR PREOPERATIVE SCREENING LABORATORY TESTING FOR COVID-19 VIRUS: ICD-10-CM

## 2022-07-03 DIAGNOSIS — Z01.812 ENCOUNTER FOR PREOPERATIVE SCREENING LABORATORY TESTING FOR COVID-19 VIRUS: ICD-10-CM

## 2022-07-03 DIAGNOSIS — Z01.818 PREOP GENERAL PHYSICAL EXAM: ICD-10-CM

## 2022-07-03 PROCEDURE — U0003 INFECTIOUS AGENT DETECTION BY NUCLEIC ACID (DNA OR RNA); SEVERE ACUTE RESPIRATORY SYNDROME CORONAVIRUS 2 (SARS-COV-2) (CORONAVIRUS DISEASE [COVID-19]), AMPLIFIED PROBE TECHNIQUE, MAKING USE OF HIGH THROUGHPUT TECHNOLOGIES AS DESCRIBED BY CMS-2020-01-R: HCPCS

## 2022-07-03 PROCEDURE — U0005 INFEC AGEN DETEC AMPLI PROBE: HCPCS

## 2022-07-04 LAB — SARS-COV-2 RNA RESP QL NAA+PROBE: NEGATIVE

## 2022-07-06 ENCOUNTER — OFFICE VISIT (OUTPATIENT)
Dept: FAMILY MEDICINE | Facility: CLINIC | Age: 42
End: 2022-07-06
Payer: COMMERCIAL

## 2022-07-06 VITALS
DIASTOLIC BLOOD PRESSURE: 80 MMHG | HEIGHT: 64 IN | HEART RATE: 70 BPM | OXYGEN SATURATION: 98 % | RESPIRATION RATE: 16 BRPM | SYSTOLIC BLOOD PRESSURE: 132 MMHG | TEMPERATURE: 98.3 F | BODY MASS INDEX: 50.02 KG/M2 | WEIGHT: 293 LBS

## 2022-07-06 DIAGNOSIS — Z12.4 CERVICAL CANCER SCREENING: ICD-10-CM

## 2022-07-06 DIAGNOSIS — Z00.00 ROUTINE GENERAL MEDICAL EXAMINATION AT A HEALTH CARE FACILITY: Primary | ICD-10-CM

## 2022-07-06 DIAGNOSIS — J45.20 INTERMITTENT ASTHMA, UNSPECIFIED ASTHMA SEVERITY, UNSPECIFIED WHETHER COMPLICATED: ICD-10-CM

## 2022-07-06 DIAGNOSIS — Z30.433 ENCOUNTER FOR REMOVAL AND REINSERTION OF INTRAUTERINE CONTRACEPTIVE DEVICE: ICD-10-CM

## 2022-07-06 PROCEDURE — 58301 REMOVE INTRAUTERINE DEVICE: CPT | Performed by: FAMILY MEDICINE

## 2022-07-06 PROCEDURE — G0145 SCR C/V CYTO,THINLAYER,RESCR: HCPCS | Performed by: FAMILY MEDICINE

## 2022-07-06 PROCEDURE — 58300 INSERT INTRAUTERINE DEVICE: CPT | Performed by: FAMILY MEDICINE

## 2022-07-06 PROCEDURE — 99396 PREV VISIT EST AGE 40-64: CPT | Mod: 25 | Performed by: FAMILY MEDICINE

## 2022-07-06 PROCEDURE — 87624 HPV HI-RISK TYP POOLED RSLT: CPT | Performed by: FAMILY MEDICINE

## 2022-07-06 ASSESSMENT — ASTHMA QUESTIONNAIRES
QUESTION_5 LAST FOUR WEEKS HOW WOULD YOU RATE YOUR ASTHMA CONTROL: WELL CONTROLLED
QUESTION_4 LAST FOUR WEEKS HOW OFTEN HAVE YOU USED YOUR RESCUE INHALER OR NEBULIZER MEDICATION (SUCH AS ALBUTEROL): NOT AT ALL
QUESTION_1 LAST FOUR WEEKS HOW MUCH OF THE TIME DID YOUR ASTHMA KEEP YOU FROM GETTING AS MUCH DONE AT WORK, SCHOOL OR AT HOME: NONE OF THE TIME
ACT_TOTALSCORE: 23
QUESTION_3 LAST FOUR WEEKS HOW OFTEN DID YOUR ASTHMA SYMPTOMS (WHEEZING, COUGHING, SHORTNESS OF BREATH, CHEST TIGHTNESS OR PAIN) WAKE YOU UP AT NIGHT OR EARLIER THAN USUAL IN THE MORNING: NOT AT ALL
ACT_TOTALSCORE: 23
QUESTION_2 LAST FOUR WEEKS HOW OFTEN HAVE YOU HAD SHORTNESS OF BREATH: ONCE OR TWICE A WEEK

## 2022-07-06 ASSESSMENT — PAIN SCALES - GENERAL: PAINLEVEL: MILD PAIN (3)

## 2022-07-06 NOTE — PROGRESS NOTES
SUBJECTIVE:    Is a pregnancy test required: No.  Was a consent obtained?  Yes    Subjective: Alma Quinn is a 42 year old No obstetric history on file. presents for IUD and desires MIRENA type IUD.  She requests removal of the IUD because the IUD effectiveness has     Patient has been given the opportunity to ask questions about all forms of birth control, including all options appropriate for Alma Quinn. Discussed that no method of birth control, except abstinence is 100% effective against pregnancy or sexually transmitted infection.     Alma Quinn understands she may have the IUD removed at any time. IUD should be removed by a health care provider and the current IUD will be removed today.    The entire removal and insertion procedure was reviewed with the patient, including care after placement.    Today's PHQ-2 Score:   PHQ-2 (  [employer]) 2022   Q1: Little interest or pleasure in doing things 0   Q2: Feeling down, depressed or hopeless 0   PHQ-2 Score 0   PHQ-2 Total Score (12-17 Years)- Positive if 3 or more points; Administer PHQ-A if positive -   Q1: Little interest or pleasure in doing things Not at all   Q2: Feeling down, depressed or hopeless Not at all   PHQ-2 Score 0       PROCEDURE:    A speculum exam was performed and the cervix was visualized. The IUD string was visualized. Using ring forceps, the string  was grasped and the IUD removed intact.    Under sterile technique, cervix was visualized with speculum and prepped with Betadine solution swab x 3. Tenaculum was placed for stability. The uterus was gently straightened and sounded to 7.0 cm. IUD prepared for placement, and IUD inserted according to 's instructions without difficulty or significant ressitance, and deployed at the fundus. The strings were visualized and trimmed to 4.0 cm from the external os. Tenaculum was removed and hemostasis noted. Speculum removed.  Patient tolerated procedure well.    Lot #  [id number]  Exp: Feb 2024    EBL: minimal    Complications: none      POST PROCEDURE:    Given 's handouts, including when to have IUD removed, list of danger s/sx, side effects and follow up recommended. Encouraged condom use for prevention of STD. Advised to call for any fever, for prolonged or severe pain or bleeding, abnormal vaginal dischage, or unable to palpate strings. She was advised to use pain medications (ibuprofen) as needed for mild to moderate pain. Advised to follow-up in clinic in 4-6 weeks for IUD string check if unable to find strings or as directed by provider.     Tomeka Leger MD
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
## 2022-07-06 NOTE — PATIENT INSTRUCTIONS
Thank you for choosing [facility].      Our Clinic hours are:  Mondays    7:20 am - 7 pm  Tues -  Fri  7:20 am - 5 pm      The clinic lab opens at 7:30 am Mon - Fri and appointments are required.    [facility]  Ph. [phone]  Monday-Thursday 8 am - 7pm  Tues/Wed/Fri 8 am - 5:30 pm

## 2022-07-09 ENCOUNTER — HOSPITAL ENCOUNTER (EMERGENCY)
Facility: CLINIC | Age: 42
Discharge: HOME | End: 2022-07-10
Attending: FAMILY MEDICINE | Admitting: FAMILY MEDICINE
Payer: COMMERCIAL

## 2022-07-09 DIAGNOSIS — L03.114 CELLULITIS OF LEFT UPPER EXTREMITY: ICD-10-CM

## 2022-07-09 LAB
BKR LAB AP GYN ADEQUACY: NORMAL
BKR LAB AP GYN INTERPRETATION: NORMAL
BKR LAB AP HPV REFLEX: NORMAL
BKR LAB AP PREVIOUS ABNORMAL: NORMAL
HOLD SPECIMEN: NORMAL
PATH REPORT.COMMENTS IMP SPEC: NORMAL
PATH REPORT.COMMENTS IMP SPEC: NORMAL
PATH REPORT.RELEVANT HX SPEC: NORMAL

## 2022-07-09 PROCEDURE — 96365 THER/PROPH/DIAG IV INF INIT: CPT | Performed by: FAMILY MEDICINE

## 2022-07-09 PROCEDURE — 250N000011 HC RX IP 250 OP 636: Performed by: FAMILY MEDICINE

## 2022-07-09 PROCEDURE — 99284 EMERGENCY DEPT VISIT MOD MDM: CPT | Mod: 25 | Performed by: FAMILY MEDICINE

## 2022-07-09 PROCEDURE — 99284 EMERGENCY DEPT VISIT MOD MDM: CPT | Performed by: FAMILY MEDICINE

## 2022-07-09 RX ORDER — CEFAZOLIN SODIUM 2 G/100ML
2 INJECTION, SOLUTION INTRAVENOUS ONCE
Status: COMPLETED | OUTPATIENT
Start: 2022-07-09 | End: 2022-07-10

## 2022-07-09 RX ORDER — CEPHALEXIN 500 MG/1
500 CAPSULE ORAL 2 TIMES DAILY
Qty: 40 CAPSULE | Refills: 0 | Status: SHIPPED | OUTPATIENT
Start: 2022-07-09 | End: 2022-07-12

## 2022-07-09 RX ADMIN — CEFAZOLIN SODIUM 2 G: 2 INJECTION, SOLUTION INTRAVENOUS at 23:21

## 2022-07-10 VITALS
HEART RATE: 63 BPM | OXYGEN SATURATION: 97 % | RESPIRATION RATE: 20 BRPM | TEMPERATURE: 98.9 F | HEIGHT: 64 IN | WEIGHT: 293 LBS | SYSTOLIC BLOOD PRESSURE: 136 MMHG | BODY MASS INDEX: 50.02 KG/M2 | DIASTOLIC BLOOD PRESSURE: 101 MMHG

## 2022-07-10 RX ORDER — CEPHALEXIN 500 MG/1
500 CAPSULE ORAL 4 TIMES DAILY
Qty: 40 CAPSULE | Refills: 0 | Status: SHIPPED | OUTPATIENT
Start: 2022-07-10 | End: 2022-07-12

## 2022-07-10 NOTE — ED NOTES
Pt presents with pain, redness, and swelling to left arm. Per pt she is 2 days post gastric bypass surgery. Redness and tenderness start directly above old IV site on left forearm and extends up arm to left shoulder. Pt states she has had low grade fevers at home around 100. Pt states adequate intact and output since surgery. IV placed in right arm, labs sent, awaiting provider assessment and further orders.

## 2022-07-10 NOTE — ED PROVIDER NOTES
"  History     Chief Complaint   Patient presents with     Arm Pain     Left arm pain and redness. Had a heparin shot in her arm 2 days ago before she had gastric bypass surgery     Fever     Temp 100.3 tonight     HPI  Alma Quinn is a 42 year old female, past medical history is significant for bipolar 1 disorder, Encinas's esophagus, morbid obesity, GERD, status post gastrectomy, lumbar radiculopathy, fibromyalgia, SAMEER, seasonal allergic rhinitis, asthma, chronic pain disorder, PCOS, SAMEER, posttraumatic stress disorder, presents to the emergency department with concerns of left arm pain and redness and fever 100.3.  Tonight.  Recent laparoscopic conversion of previous sleeve gastrectomy to gastric bypass on 7/7/2022 at [facility] with Dr. Goetz.  History is obtained from the patient who states that she is 2 days post gastric surgery as described above, she presents now with concerns that in the back of her left upper arm i.e. triceps area where she was given a heparin shot she is to follow-up diffuse redness warmth, questionable whether there is similar involving the left arm near IV site in the left forearm from that admission.  She identified temperature of 100.3 at home tonight and was advised by a family friend who is a physician to be seen in the emergency department.  She notes no nausea or vomiting.  No chills or sweats.  The patient was not aware that she had a fever until she took it.      Allergies:  Allergies   Allergen Reactions     Banana Extract Allergy Skin Test Other (See Comments) and Rash     Kiwi, avocado     Adhesive Tape Other (See Comments)     Open sores.     Kiwi Other (See Comments)     Melon Itching     Nsaids Other (See Comments)     H/o anne-n-y gastric bypass\". AVOID NSAIDs and aspirin due to risk of gastric and/or G-J anastomotic ulcers. If Alma must be on short course of NSAIDs or aspirin, use enteric coated if possible and use PPI // Selina Lazar RN, " Bariatric Nurse Clinician, [facility] 7/8/2022     Nuts Hives     Shellfish Allergy      Avocado Rash     Banana Rash     Kiwi, avocado     Latex Rash     Pcn [Penicillins] Rash     Plaquenil [Hydroxychloroquine] Rash     Sulfanilamide Rash       Problem List:    Patient Active Problem List    Diagnosis Date Noted     Bipolar 1 disorder (H) 04/13/2011     Priority: High     Sees psychiatric nurse, Margaux Azul NP with [facility]       Encinas's esophagus determined by endoscopy 02/01/2022     Priority: Medium     Morbid obesity (H) 01/24/2020     Priority: Medium     GERD (gastroesophageal reflux disease) 10/19/2012     Priority: Medium     S/P gastrectomy 07/11/2012     Priority: Medium     Overview:   Lap Sleeve Gastrectomy by Dr. Ephraim Blood from bariatric surgery. DOS 7/11/2012 at [facility], [place], [place]       Lumbar radiculopathy 11/04/2011     Priority: Medium     Acute bilateral leg weakness 2/11 and found to have L5-S1 herniated disk with compression of left L5 nerve root.    S/p left L5-S1 hemilaminectomy and microdiscectomy 2/24/11.  Continues to have residual left leg weakness and pain       Weakness of left leg 11/04/2011     Priority: Medium     Fibromyalgia 05/23/2011     Priority: Medium     DDD (degenerative disc disease), lumbar 04/13/2011     Priority: Medium     MRI 2/11- L5-S1 large central posterior extruded disc herniation extending inferior to the disc level, causing slight impression on  thecal sac and left S1 nerve root sleeve. S/p Left L5-S1 hemilaminectomy discectomy.        SAMEER (obstructive sleep apnea) 06/17/2010     Priority: Medium     Not tolerating CPAP, has deviated septum - referred to ENT in 12/09 and prescribed flonase       Seasonal allergic rhinitis 06/17/2010     Priority: Medium     Intermittent asthma 06/17/2010     Priority: Medium     Uses inhaled steroid in fall, typically       Chronic pain disorder 06/17/2010      Priority: Medium     Started in 2008 3 months postpartum.  Pain in her entire body with muscle cramping- arms, legs, elbows, knees, ankles, hips, shoulders, fingers etc.  Has been on multiple meds icluding gabapentin, cymbalta, NSAIDs, flexeril and narcotics with minimal benefit.  High doses of prednisone helped some.  Insurance would not cover lyrica.  Mild elevation of ESR to ~30 and CRP to ~24.  RF was 14 (normal 0-14).  CCP was negative  MRI 4/09:  Degenerative changes at L5-S1, mod to mild corby foraminal narrowing with mild facet arthropathy, no nerve impingement  Negative ehrlichia and lyme testing in 5/09  Mildly decreased vitamin D level at 20 in 10/08 - corrected to 25 in 1/09, then put on 50,000 units/week  Vitamin D level 5/10 was 16  ? Fibromyalgia vs chronic pain syndrome vs vitamin D deficiency?  Normal hand and foot xrays 7/09  Seen by Dr. Gildardo Villeda, rheumatology 6/10 - lab w/u and exam negative for RA.  Vitamin D levels low - recommended vitamin D3 4000 units/day x 8 weeks, then 2000 units/day.  Also referral to PT for iliotibial band syndrome and corby knee pain.  Also referral to rheumatology nursing associates for fibromyalgia.  Also emphasized need to treat SAMEER and possible bariatric surgery.       PCOS (polycystic ovarian syndrome) 06/17/2010     Priority: Medium     Generalized osteoarthrosis, involving multiple sites 05/07/2010     Priority: Medium     Bipolar I disorder (H) 05/07/2010     Priority: Medium     Status post bariatric surgery 02/04/2009     Priority: Medium     Posttraumatic stress disorder 09/16/2008     Priority: Medium     CARDIOVASCULAR SCREENING; LDL GOAL LESS THAN 160 10/31/2010     Priority: Low        Past Medical History:    Past Medical History:   Diagnosis Date     Cystic kidney disease      Depression      PONV (postoperative nausea and vomiting)      Post traumatic stress disorder      Seasonal allergies      Toxemia      Uncomplicated asthma        Past Surgical  History:    Past Surgical History:   Procedure Laterality Date     ADENOIDECTOMY       APPENDECTOMY       ARTHROSCOPY KNEE WITH MEDIAL MENISCECTOMY  2014    Procedure: ARTHROSCOPY KNEE WITH MEDIAL MENISCECTOMY;  Surgeon: Arron Curtis MD;  Location: [facility] OR     ARTHROSCOPY KNEE WITH MEDIAL MENISCECTOMY Left 2015    Procedure: ARTHROSCOPY KNEE WITH MEDIAL MENISCECTOMY;  Surgeon: Arron Curtis MD;  Location: [facility] OR     C/SECTION, CLASSICAL      , Classical     CHOLECYSTECTOMY, LAPOROSCOPIC  10/07    Cholecystectomy, Laparoscopic     HEMILAMINECTOMY, DISCECTOMY LUMBAR TWO LEVELS, COMBINED  11    Left L5-S1 hemilaminectomy for discectomy      LAPAROSCOPIC GASTRIC SLEEVE  12    lap sleeve gastrectomy     SURGICAL HISTORY OF -       Kidney Surgery     SURGICAL HISTORY OF -       Urethral implants     TONSILLECTOMY         Family History:    Family History   Problem Relation Age of Onset     Gastrointestinal Disease Mother         chrohns     Diabetes Father      Cancer Father         had cancer  between lining of lungs     Cardiovascular Father 57        4 valves replaced     Lupus Father      Breast Cancer Maternal Grandmother      Respiratory Maternal Grandfather         COPD     Diabetes Paternal Grandmother      C.A.D. Paternal Grandmother      Asthma Paternal Grandmother      Diabetes Paternal Grandfather      Prostate Cancer Paternal Grandfather      Asthma Son      Asthma Daughter        Social History:  Marital Status:   [2]  Social History     Tobacco Use     Smoking status: Former Smoker     Packs/day: 0.50     Years: 15.00     Pack years: 7.50     Types: Cigarettes     Quit date: 2014     Years since quittin.0     Smokeless tobacco: Never Used     Tobacco comment: 1/4 pack daily   Vaping Use     Vaping Use: Never used   Substance Use Topics     Alcohol use: No     Drug use: No        Medications:    cephALEXin (KEFLEX) 500 MG capsule  cephALEXin  "(KEFLEX) 500 MG capsule  albuterol (PROAIR HFA/PROVENTIL HFA/VENTOLIN HFA) 108 (90 Base) MCG/ACT inhaler  albuterol (PROVENTIL) (2.5 MG/3ML) 0.083% neb solution  budesonide (PULMICORT) 0.25 MG/2ML neb solution  buPROPion (WELLBUTRIN XL) 300 MG 24 hr tablet  Calcium-Magnesium-Zinc 333-133-5 MG TABS per tablet  Cholecalciferol (VITAMIN D3) 36295 UNIT CAPS  EPINEPHrine (ANY BX GENERIC EQUIV) 0.3 MG/0.3ML injection 2-pack  fluticasone (FLOVENT HFA) 110 MCG/ACT inhaler  gabapentin (NEURONTIN) 300 MG capsule  hydrOXYzine (ATARAX) 50 MG tablet  levonorgestrel (MIRENA) 20 MCG/DAY IUD  melatonin 5 MG tablet  metFORMIN (GLUCOPHAGE-XR) 750 MG 24 hr tablet  methocarbamol (ROBAXIN) 500 MG tablet  montelukast (SINGULAIR) 10 MG tablet  multivitamin w/minerals (THERA-VIT-M) tablet  Omega-3 Fatty Acids (OMEGA-3 FISH OIL PO)  omeprazole (PRILOSEC) 40 MG DR capsule  ondansetron (ZOFRAN) 4 MG tablet  ORDER FOR DME  TURMERIC PO  vitamin B complex with vitamin C (STRESS TAB) tablet          Review of Systems   All other systems reviewed and are negative.      Physical Exam   BP: 129/77  Pulse: 67  Temp: 98.9  F (37.2  C)  Resp: 16  Height: 161.9 cm (5' 3.75\")  Weight: 139.3 kg (307 lb) (stated)  SpO2: 94 %      Physical Exam  Vitals and nursing note reviewed.   Constitutional:       General: She is not in acute distress.     Appearance: Normal appearance. She is obese. She is not ill-appearing.   Skin:     General: Skin is warm.      Findings: Erythema present.      Comments: The left tricep area is notable for diffuse erythema and warmth irregular but around 6-7 cm in diameter radiating outward from the injection site described historically.  There is also faint erythema in the volar aspect left forearm.  There is no axillary or epitrochlear lymphadenopathy identified.   Neurological:      Mental Status: She is alert.         ED Course                 Procedures              Critical Care time:  none               Results for orders " placed or performed during the hospital encounter of 07/09/22 (from the past 24 hour(s))   [place] Draw    Narrative    The following orders were created for panel order [place] Draw.  Procedure                               Abnormality         Status                     ---------                               -----------         ------                     Extra Blue Top Tube[[id number]]                              Final result               Extra Red Top Tube[[id number]]                               Final result               Extra Green Top (Lithium...[[id number]]                      Final result               Extra Purple Top Tube[[id number]]                            Final result                 Please view results for these tests on the individual orders.   Extra Blue Top Tube   Result Value Ref Range    Hold Specimen JIC    Extra Red Top Tube   Result Value Ref Range    Hold Specimen JIC    Extra Green Top (Lithium Heparin) Tube   Result Value Ref Range    Hold Specimen JIC    Extra Purple Top Tube   Result Value Ref Range    Hold Specimen JIC        Medications   ceFAZolin (ANCEF) intermittent infusion 2 g in 100 mL dextrose PRE-MIX (0 g Intravenous Stopped 7/10/22 0005)   12:16 AM  Patient remained comfortable without evidence of any acute reaction to the Ancef.  I discussed plan for disposition to home with her with which she is in agreement.  Oral antibiotics are prescribed the form of Keflex 500 mg p.o. 4 times daily x10 days.  Return criteria are reviewed with the patient.  She should expect some improvement over the next 24 to 48 hours.  If this is not happening she will return, any worsening she should return.        Assessments & Plan (with Medical Decision Making)   42-year-old female past medical history reviewed as above who presents to the emergency department with concerns of redness and warmth in the left upper extremity.  Context of recent hospitalization as discussed in the HPI and documented  with respect abnormals on exam.  Exam is consistent with mild cellulitis.  The patient is vitally stable on evaluation without hypotension, fever, tachycardia, hypoxia or tachypnea.  She received a dose of IV Ancef in the emergency department and will be discharged home on Keflex 500 mg p.o. 4 times daily x10 days.  Return criteria reviewed as discussed at the time stamp above.      Disclaimer: This note consists of symbols derived from keyboarding, dictation and/or voice recognition software. As a result, there may be errors in the script that have gone undetected. Please consider this when interpreting information found in this chart.      I have reviewed the nursing notes.    I have reviewed the findings, diagnosis, plan and need for follow up with the patient.          New Prescriptions    CEPHALEXIN (KEFLEX) 500 MG CAPSULE    Take 1 capsule (500 mg) by mouth 2 times daily    CEPHALEXIN (KEFLEX) 500 MG CAPSULE    Take 1 capsule (500 mg) by mouth 4 times daily for 10 days       Final diagnoses:   Cellulitis of left upper extremity       7/9/2022   [facility] EMERGENCY DEPT     Zack Perez MD  07/10/22 0019

## 2022-07-10 NOTE — DISCHARGE INSTRUCTIONS
Keflex 500 mg p.o. 4 times daily x10 days.  Please keep a close eye on the area of redness and if this is not improving over the course of the next 48 hours please return for repeat evaluation.  Continue all current medications otherwise.

## 2022-07-12 ENCOUNTER — VIRTUAL VISIT (OUTPATIENT)
Dept: FAMILY MEDICINE | Facility: CLINIC | Age: 42
End: 2022-07-12
Payer: COMMERCIAL

## 2022-07-12 DIAGNOSIS — L03.90 CELLULITIS, UNSPECIFIED CELLULITIS SITE: ICD-10-CM

## 2022-07-12 DIAGNOSIS — Z90.3 S/P GASTRECTOMY: Primary | ICD-10-CM

## 2022-07-12 DIAGNOSIS — J45.20 INTERMITTENT ASTHMA, UNSPECIFIED ASTHMA SEVERITY, UNSPECIFIED WHETHER COMPLICATED: ICD-10-CM

## 2022-07-12 DIAGNOSIS — G47.00 INSOMNIA, UNSPECIFIED TYPE: ICD-10-CM

## 2022-07-12 LAB
HUMAN PAPILLOMA VIRUS 16 DNA: NEGATIVE
HUMAN PAPILLOMA VIRUS 18 DNA: NEGATIVE
HUMAN PAPILLOMA VIRUS FINAL DIAGNOSIS: NORMAL
HUMAN PAPILLOMA VIRUS OTHER HR: NEGATIVE

## 2022-07-12 PROCEDURE — 99214 OFFICE O/P EST MOD 30 MIN: CPT | Mod: 95 | Performed by: FAMILY MEDICINE

## 2022-07-12 RX ORDER — MONTELUKAST SODIUM 10 MG/1
10 TABLET ORAL AT BEDTIME
Qty: 90 TABLET | Refills: 0 | Status: SHIPPED | OUTPATIENT
Start: 2022-07-12 | End: 2022-10-17

## 2022-07-12 RX ORDER — CEPHALEXIN 250 MG/5ML
500 POWDER, FOR SUSPENSION ORAL 4 TIMES DAILY
Qty: 400 ML | Refills: 0 | Status: SHIPPED | OUTPATIENT
Start: 2022-07-12 | End: 2022-07-22

## 2022-07-12 RX ORDER — HYDROXYZINE HYDROCHLORIDE 25 MG/1
TABLET, FILM COATED ORAL
Qty: 60 TABLET | Refills: 0 | Status: SHIPPED | OUTPATIENT
Start: 2022-07-12 | End: 2022-10-17

## 2022-07-12 NOTE — PROGRESS NOTES
Donna is a 42 year old who is being evaluated via a billable video visit.      How would you like to obtain your AVS? MyChart  If the video visit is dropped, the invitation should be resent by: Send to e-mail at: [email or web address]  Will anyone else be joining your video visit? No        Assessment & Plan     Alma was seen today for consult and er f/u.    Diagnoses and all orders for this visit:    S/P gastrectomy  and   Cellulitis, unspecified cellulitis site    Improving and healing, but unable to swallow pills due to recent gastric bypass  Plan:    Switch keflex to liquid form; take full 10 days as only was able to take 1 dose    Let us know if worsens or redness returns    Switch omeprazole to liquid form - was instructed by surgeon to take 40 mg x 30 days, will switch to liquid form.    Discussed with patient, all questions answered, in agreement with this plan, will return or seek further care if not improving or worsening.  -     cephALEXin (KEFLEX) 250 MG/5ML suspension; Take 10 mLs (500 mg) by mouth 4 times daily for 10 days  -     omeprazole (PRILOSEC) 2 mg/mL suspension; Take 10 mLs (20 mg) by mouth 2 times daily for 30 days    Insomnia, unspecified type    Uses hydroxyzine rarely and only 25 mg most of the time    Switched dose to how patient takes    Refilled only #60, then follow up with PCP (appointment later this month)  -     hydrOXYzine (ATARAX) 25 MG tablet; TAKE 1-2 TABLETS BY MOUTH AT BEDTIME AS NEEDED FOR sleep    Intermittent asthma, unspecified asthma severity, unspecified whether complicated    Refilled for short term, will follow up with PCP later this month for full refill and check in.  -     montelukast (SINGULAIR) 10 MG tablet; Take 1 tablet (10 mg) by mouth At Bedtime          Prescription drug management        Return in about 4 weeks (around 8/9/2022) for already scheduled appt, with your PCP.    Maribel Anderson MD  [facility]   Donna is  "a 42 year old, presenting for the following health issues:  Consult (Taking medication after bypass surgery) and ER F/U      HPI     ED/UC Followup:    Facility:  [facility] ED   Date of visit: 07/9/2022-07/10/2022  Reason for visit: Cellulitis of left upper extremity    Current Status: left arm pain slightly, hard lump in forearm, no redness, using heat, no swelling. Fever 98.9 this morning.     Additional: would like to follow up on gastric surgery.     42 year old new to me today.  Had revision gastric bypass surgery last Thursday  Discharged on Friday  emergency room on Saturday for cellulitis and thrombophlebitis.  They gave her pills - making her sick.  Feeling better.  Sore.  Back at work.  Pills won't go down - vomits them back.    Otherwise improving every day.  Red line gone      Review of Systems   Constitutional, HEENT, cardiovascular, pulmonary, gi and gu systems are negative, except as otherwise noted.      Objective    Vitals - Patient Reported  Weight (Patient Reported): 137.9 kg (304 lb)  Height (Patient Reported): 160 cm (5' 3\")  BMI (Based on Pt Reported Ht/Wt): 53.85  SpO2 (Patient Reported): 93  Temperature (Patient Reported): 98.9  F (37.2  C)  Pulse (Patient Reported): 66      Vitals:  No vitals were obtained today due to virtual visit.    Physical Exam   GENERAL: Healthy, alert and no distress  EYES: Eyes grossly normal to inspection.  No discharge or erythema, or obvious scleral/conjunctival abnormalities.  RESP: No audible wheeze, cough, or visible cyanosis.  No visible retractions or increased work of breathing.    SKIN: Visible skin clear. No significant rash, abnormal pigmentation or lesions.  NEURO: Cranial nerves grossly intact.  Mentation and speech appropriate for age.  PSYCH: Mentation appears normal, affect normal/bright, judgement and insight intact, normal speech and appearance well-groomed.                Video-Visit Details    Video Start Time: 10:35 AM    Type of service:  " Video Visit    Video End Time:10:48 AM    Originating Location (pt. Location): Home    Distant Location (provider location):  [facility]     Platform used for Video Visit: Swati Rascon

## 2022-09-14 ENCOUNTER — MYC MEDICAL ADVICE (OUTPATIENT)
Dept: FAMILY MEDICINE | Facility: CLINIC | Age: 42
End: 2022-09-14

## 2022-09-15 ENCOUNTER — VIRTUAL VISIT (OUTPATIENT)
Dept: FAMILY MEDICINE | Facility: CLINIC | Age: 42
End: 2022-09-15
Payer: COMMERCIAL

## 2022-09-15 DIAGNOSIS — R30.0 DYSURIA: Primary | ICD-10-CM

## 2022-09-15 PROCEDURE — 99213 OFFICE O/P EST LOW 20 MIN: CPT | Mod: TEL | Performed by: NURSE PRACTITIONER

## 2022-09-15 RX ORDER — NITROFURANTOIN 25; 75 MG/1; MG/1
100 CAPSULE ORAL 2 TIMES DAILY
Qty: 14 CAPSULE | Refills: 0 | Status: SHIPPED | OUTPATIENT
Start: 2022-09-15 | End: 2022-09-22

## 2022-09-15 NOTE — PROGRESS NOTES
Donna is a 42 year old who is being evaluated via a billable telephone visit.      What phone number would you like to be contacted at? [phone]  How would you like to obtain your AVS? MyChart    Assessment & Plan     Dysuria  Suspect UTI  - nitroFURantoin macrocrystal-monohydrate (MACROBID) 100 MG capsule  Dispense: 14 capsule; Refill: 0               FUTURE APPOINTMENTS:       - Follow up in 3 days for symptoms that are not improving, sooner for new or worsening sx.     See Patient Instructions    No follow-ups on file.    Randi Lynn, DELANO CNP  [facility]   Donna is a 42 year old, presenting for the following health issues:  Urinary Problem      HPI     Genitourinary - Female  Onset/Duration: 5 days   Description:   Painful urination (Dysuria): YES           Frequency: YES  Blood in urine (Hematuria): No  Delay in urine (Hesitency): YES  Intensity: moderate  Progression of Symptoms:  same  Accompanying Signs & Symptoms:  Fever/chills: No  Flank pain: YES- left   Nausea and vomiting: No  Vaginal symptoms: discharge  Abdominal/Pelvic Pain: YES- pelvic pain   History:   History of frequent UTI s: No  History of kidney stones: No  Sexually Active: YES  Possibility of pregnancy: No  Precipitating or alleviating factors: stress  Therapies tried and outcome: Cranberry juice prn (contraindicated in Coumadin patients) and Increase fluid intake      Review of Systems   Constitutional, HEENT, cardiovascular, pulmonary, gi and gu systems are negative, except as otherwise noted.      Objective           Vitals:  No vitals were obtained today due to virtual visit.    Physical Exam   healthy, alert and no distress  PSYCH: Alert and oriented times 3; coherent speech, normal   rate and volume, able to articulate logical thoughts, able   to abstract reason, no tangential thoughts, no hallucinations   or delusions  Her affect is normal and pleasant  RESP: No cough, no audible  wheezing, able to talk in full sentences  Remainder of exam unable to be completed due to telephone visits                Phone call duration: 7 minutes

## 2022-10-17 ENCOUNTER — VIRTUAL VISIT (OUTPATIENT)
Dept: FAMILY MEDICINE | Facility: CLINIC | Age: 42
End: 2022-10-17
Payer: COMMERCIAL

## 2022-10-17 DIAGNOSIS — J45.20 INTERMITTENT ASTHMA, UNSPECIFIED ASTHMA SEVERITY, UNSPECIFIED WHETHER COMPLICATED: Primary | ICD-10-CM

## 2022-10-17 DIAGNOSIS — G47.00 INSOMNIA, UNSPECIFIED TYPE: ICD-10-CM

## 2022-10-17 DIAGNOSIS — G25.81 RESTLESS LEGS SYNDROME (RLS): ICD-10-CM

## 2022-10-17 DIAGNOSIS — F31.9 BIPOLAR AFFECTIVE DISORDER, REMISSION STATUS UNSPECIFIED (H): ICD-10-CM

## 2022-10-17 PROCEDURE — 99214 OFFICE O/P EST MOD 30 MIN: CPT | Mod: 95 | Performed by: FAMILY MEDICINE

## 2022-10-17 RX ORDER — BUPROPION HYDROCHLORIDE 300 MG/1
300 TABLET ORAL EVERY MORNING
Qty: 90 TABLET | Refills: 4 | Status: SHIPPED | OUTPATIENT
Start: 2022-10-17 | End: 2023-09-14

## 2022-10-17 RX ORDER — HYDROXYZINE PAMOATE 50 MG/1
50-100 CAPSULE ORAL
Qty: 120 CAPSULE | Refills: 5 | Status: SHIPPED | OUTPATIENT
Start: 2022-10-17

## 2022-10-17 RX ORDER — MONTELUKAST SODIUM 10 MG/1
10 TABLET ORAL AT BEDTIME
Qty: 90 TABLET | Refills: 3 | Status: SHIPPED | OUTPATIENT
Start: 2022-10-17 | End: 2023-09-14

## 2022-10-17 RX ORDER — HYDROXYZINE HYDROCHLORIDE 25 MG/1
TABLET, FILM COATED ORAL
Qty: 60 TABLET | Refills: 0 | Status: CANCELLED | OUTPATIENT
Start: 2022-10-17

## 2022-10-17 RX ORDER — FLUTICASONE PROPIONATE 110 UG/1
1 AEROSOL, METERED RESPIRATORY (INHALATION) 2 TIMES DAILY
Qty: 12 G | Refills: 11 | Status: SHIPPED | OUTPATIENT
Start: 2022-10-17 | End: 2023-05-09

## 2022-10-17 ASSESSMENT — ASTHMA QUESTIONNAIRES: ACT_TOTALSCORE: 20

## 2022-10-17 NOTE — PROGRESS NOTES
Donna is a 42 year old who is being evaluated via a billable video visit.      How would you like to obtain your AVS? MyChart  If the video visit is dropped, the invitation should be resent by: Text to cell phone: [phone]  Will anyone else be joining your video visit? No        Assessment & Plan     Intermittent asthma, unspecified asthma severity, unspecified whether complicated     - montelukast (SINGULAIR) 10 MG tablet; Take 1 tablet (10 mg) by mouth At Bedtime  - fluticasone (FLOVENT HFA) 110 MCG/ACT inhaler; Inhale 1 puff into the lungs 2 times daily    Insomnia, unspecified type   vistaril has worked well for her  ambien caused hallucinations  Gabapentin caused foot swelling    - hydrOXYzine (VISTARIL) 50 MG capsule; Take 1-2 capsules ( mg) by mouth nightly as needed for itching or other (insomnia)      Bipolar affective disorder, remission status unspecified (H)  Stable, doing okay on this medication  Back to XL after her gastric bypass revision  - buPROPion (WELLBUTRIN XL) 300 MG 24 hr tablet; Take 1 tablet (300 mg) by mouth every morning    Restless legs syndrome (RLS)  Ferritin 8 months ago was 41- goal for RLS is >75 or so  With gastric bypass suspect she has some absorption issues  Will recheck ferritin in 2 months, may need iron infusions  - Ferritin; Future                 No follow-ups on file.    Tomeka Leger MD  [facility]   Donna is a 42 year old, presenting for the following health issues:  Video Visit (Insomnia)      HPI     Asthma Follow-Up  Flovent HFA 110mcg/ACT bid, singulair 10mg qhs  Was ACT completed today?    Yes    ACT Total Scores 10/17/2022   ACT TOTAL SCORE -   ASTHMA ER VISITS -   ASTHMA HOSPITALIZATIONS -   ACT TOTAL SCORE (Goal Greater than or Equal to 20) 20   In the past 12 months, how many times did you visit the emergency room for your asthma without being admitted to the hospital? 0   In the past 12 months, how many times  were you hospitalized overnight because of your asthma? 0          How many days per week do you miss taking your asthma controller medication?  0    Please describe any recent triggers for your asthma: pollens    Have you had any Emergency Room Visits, Urgent Care Visits, or Hospital Admissions since your last office visit?  No      Medication Followup of insomnia  Atarax 25-50mg at bedtime prn    Taking Medication as prescribed: yes    Side Effects:  None    Medication Helping Symptoms:  yes    - Restless legs at night. She has taken Gabapentin for this in the past but stopped due to causing swelling.    Review of Systems   Constitutional, HEENT, cardiovascular, pulmonary, gi and gu systems are negative, except as otherwise noted.      Objective           Vitals:  No vitals were obtained today due to virtual visit.    Physical Exam   GENERAL: Healthy, alert and no distress  EYES: Eyes grossly normal to inspection.  No discharge or erythema, or obvious scleral/conjunctival abnormalities.  RESP: No audible wheeze, cough, or visible cyanosis.  No visible retractions or increased work of breathing.    SKIN: Visible skin clear. No significant rash, abnormal pigmentation or lesions.  NEURO: Cranial nerves grossly intact.  Mentation and speech appropriate for age.  PSYCH: Mentation appears normal, affect normal/bright, judgement and insight intact, normal speech and appearance well-groomed.    Past labs reviewed            Video-Visit Details    Video Start Time: 1208 pm    Type of service:  Video Visit    Video End Time:12:18 PM    Originating Location (pt. Location): Other work - in [place]    Distant Location (provider location):  On-site    Platform used for Video Visit: Swati

## 2022-11-01 ENCOUNTER — IMMUNIZATION (OUTPATIENT)
Dept: FAMILY MEDICINE | Facility: CLINIC | Age: 42
End: 2022-11-01
Payer: COMMERCIAL

## 2022-11-01 PROCEDURE — 91313 COVID-19,PF,MODERNA BIVALENT: CPT

## 2022-11-01 PROCEDURE — 0134A COVID-19,PF,MODERNA BIVALENT: CPT

## 2022-11-19 ENCOUNTER — HEALTH MAINTENANCE LETTER (OUTPATIENT)
Age: 42
End: 2022-11-19

## 2023-03-25 DIAGNOSIS — J45.20 INTERMITTENT ASTHMA, UNSPECIFIED ASTHMA SEVERITY, UNSPECIFIED WHETHER COMPLICATED: ICD-10-CM

## 2023-03-27 RX ORDER — ALBUTEROL SULFATE 90 UG/1
AEROSOL, METERED RESPIRATORY (INHALATION)
Qty: 18 G | Refills: 0 | Status: SHIPPED | OUTPATIENT
Start: 2023-03-27 | End: 2023-05-09

## 2023-05-08 ENCOUNTER — E-VISIT (OUTPATIENT)
Dept: URGENT CARE | Facility: CLINIC | Age: 43
End: 2023-05-08
Payer: COMMERCIAL

## 2023-05-08 DIAGNOSIS — R05.1 ACUTE COUGH: Primary | ICD-10-CM

## 2023-05-08 PROCEDURE — 99207 PR NON-BILLABLE SERV PER CHARTING: CPT | Performed by: FAMILY MEDICINE

## 2023-05-08 NOTE — PATIENT INSTRUCTIONS
Dear Alma Quinn,    We are sorry you are not feeling well. Based on the responses you provided, it is recommended that you be seen in-person in urgent care so we can better evaluate your symptoms. Please click here to find the nearest urgent care location to you.   You will not be charged for this Visit. Thank you for trusting us with your care.    DELANO MARTINEZ CNP

## 2023-05-09 ENCOUNTER — VIRTUAL VISIT (OUTPATIENT)
Dept: FAMILY MEDICINE | Facility: CLINIC | Age: 43
End: 2023-05-09
Payer: COMMERCIAL

## 2023-05-09 DIAGNOSIS — J30.9 ALLERGIC RHINITIS, UNSPECIFIED SEASONALITY, UNSPECIFIED TRIGGER: ICD-10-CM

## 2023-05-09 DIAGNOSIS — J45.31 MILD PERSISTENT ASTHMA WITH EXACERBATION: Primary | ICD-10-CM

## 2023-05-09 PROCEDURE — 99214 OFFICE O/P EST MOD 30 MIN: CPT | Mod: VID | Performed by: NURSE PRACTITIONER

## 2023-05-09 RX ORDER — ALBUTEROL SULFATE 90 UG/1
AEROSOL, METERED RESPIRATORY (INHALATION)
Qty: 18 G | Refills: 0 | Status: SHIPPED | OUTPATIENT
Start: 2023-05-09 | End: 2023-06-01

## 2023-05-09 RX ORDER — PREDNISONE 20 MG/1
20 TABLET ORAL 2 TIMES DAILY
Qty: 10 TABLET | Refills: 0 | Status: SHIPPED | OUTPATIENT
Start: 2023-05-09 | End: 2023-05-14

## 2023-05-09 RX ORDER — FLUTICASONE PROPIONATE 110 UG/1
1 AEROSOL, METERED RESPIRATORY (INHALATION) 2 TIMES DAILY
Qty: 12 G | Refills: 0 | Status: SHIPPED | OUTPATIENT
Start: 2023-05-09 | End: 2023-07-05

## 2023-05-09 ASSESSMENT — ASTHMA QUESTIONNAIRES
QUESTION_1 LAST FOUR WEEKS HOW MUCH OF THE TIME DID YOUR ASTHMA KEEP YOU FROM GETTING AS MUCH DONE AT WORK, SCHOOL OR AT HOME: A LITTLE OF THE TIME
QUESTION_4 LAST FOUR WEEKS HOW OFTEN HAVE YOU USED YOUR RESCUE INHALER OR NEBULIZER MEDICATION (SUCH AS ALBUTEROL): TWO OR THREE TIMES PER WEEK
QUESTION_5 LAST FOUR WEEKS HOW WOULD YOU RATE YOUR ASTHMA CONTROL: POORLY CONTROLLED
ACT_TOTALSCORE: 16
QUESTION_2 LAST FOUR WEEKS HOW OFTEN HAVE YOU HAD SHORTNESS OF BREATH: THREE TO SIX TIMES A WEEK
ACT_TOTALSCORE: 16
QUESTION_3 LAST FOUR WEEKS HOW OFTEN DID YOUR ASTHMA SYMPTOMS (WHEEZING, COUGHING, SHORTNESS OF BREATH, CHEST TIGHTNESS OR PAIN) WAKE YOU UP AT NIGHT OR EARLIER THAN USUAL IN THE MORNING: ONCE OR TWICE

## 2023-05-09 NOTE — PROGRESS NOTES
Donna is a 42 year old who is being evaluated via a billable video visit.      How would you like to obtain your AVS? MyChart  If the video visit is dropped, the invitation should be resent by: Text to cell phone: [phone]  Will anyone else be joining your video visit? No          Mild persistent asthma with exacerbation  Provided prescription for Flovent to use daily albuterol as needed.  Due to asthma exacerbation, will treat with prednisone 20 mg twice daily for 5 days.  Educated on its indications and side effects.  Recommend close follow-up with PCP if symptoms persist.  - albuterol (PROAIR HFA/PROVENTIL HFA/VENTOLIN HFA) 108 (90 Base) MCG/ACT inhaler  Dispense: 18 g; Refill: 0  - predniSONE (DELTASONE) 20 MG tablet  Dispense: 10 tablet; Refill: 0  - fluticasone (FLOVENT HFA) 110 MCG/ACT inhaler  Dispense: 12 g; Refill: 0    Allergic rhinitis, unspecified seasonality, unspecified trigger  Patient continues Flonase, montelukast, Allegra.        Subjective   Donna is a 42 year old, presenting for the following health issues:  No chief complaint on file.  Patient presents today for concerns of an asthma exacerbation.  Patient has allergies and has been experiencing allergy symptoms for 2 weeks.  He complains of itchy eyes, nonproductive cough, sneezing, rhinorrhea, postnasal drainage.  She is currently in [place], [place] and did not travel with her inhaler.  Most recently, she developed wheezing and shortness of breath which has worsened at bedtime.  She does take Allegra, montelukast, Flonase.  She does not have any inhalers available to use.           View : No data to display.              History of Present Illness     Asthma:  She presents for follow up of asthma.  She has some cough, some wheezing, and some shortness of breath. She is using a relief medication a few times a week. She does not miss any doses of her controller medication throughout the week.Patient is aware of the following triggers:  dust mites and pollens. The patient has not had a visit to the Emergency Room, Urgent Care or Hospital due to asthma since the last clinic visit.     Reason for visit:  Allergic    She eats 2-3 servings of fruits and vegetables daily.She consumes 0 sweetened beverage(s) daily.She exercises with enough effort to increase her heart rate 9 or less minutes per day.  She exercises with enough effort to increase her heart rate 3 or less days per week.   She is taking medications regularly.         Review of Systems   Constitutional, HEENT, cardiovascular, pulmonary, gi and gu systems are negative, except as otherwise noted.      Objective           Vitals:  No vitals were obtained today due to virtual visit.    Physical Exam   GENERAL: Healthy, alert and no distress  EYES: Eyes grossly normal to inspection.  No discharge or erythema, or obvious scleral/conjunctival abnormalities.  HENT: Normal cephalic/atraumatic.  External ears, nose and mouth without ulcers or lesions.  No nasal drainage visible.  NECK: No asymmetry, visible masses or scars  RESP: No audible wheeze, cough, or visible cyanosis.  No visible retractions or increased work of breathing.    MS: No gross musculoskeletal defects noted.  Normal range of motion.  No visible edema.  SKIN: Visible skin clear. No significant rash, abnormal pigmentation or lesions.  NEURO: Cranial nerves grossly intact.  Mentation and speech appropriate for age.  PSYCH: Mentation appears normal, affect normal/bright, judgement and insight intact, normal speech and appearance well-groomed.      Video-Visit Details    Type of service:  Video Visit     Originating Location (pt. Location): [facility] [place]    Distant Location (provider location):  On-site  Platform used for Video Visit: Tap 'n Tap

## 2023-05-27 DIAGNOSIS — J45.31 MILD PERSISTENT ASTHMA WITH EXACERBATION: ICD-10-CM

## 2023-06-01 ENCOUNTER — HEALTH MAINTENANCE LETTER (OUTPATIENT)
Age: 43
End: 2023-06-01

## 2023-06-01 RX ORDER — ALBUTEROL SULFATE 90 UG/1
AEROSOL, METERED RESPIRATORY (INHALATION)
Qty: 1 G | Refills: 0 | Status: SHIPPED | OUTPATIENT
Start: 2023-06-01 | End: 2024-04-05

## 2023-06-06 ENCOUNTER — PATIENT OUTREACH (OUTPATIENT)
Dept: CARE COORDINATION | Facility: CLINIC | Age: 43
End: 2023-06-06
Payer: COMMERCIAL

## 2023-06-20 ENCOUNTER — PATIENT OUTREACH (OUTPATIENT)
Dept: CARE COORDINATION | Facility: CLINIC | Age: 43
End: 2023-06-20
Payer: COMMERCIAL

## 2023-07-06 ENCOUNTER — TELEPHONE (OUTPATIENT)
Dept: FAMILY MEDICINE | Facility: CLINIC | Age: 43
End: 2023-07-06

## 2023-07-06 NOTE — TELEPHONE ENCOUNTER
PA Initiation    Medication: Flovent HFA 110mcg/act Aerosol  Insurance Company:  [employer] [place] [employer]  Pharmacy Filling the Rx:  [facility]  Filling Pharmacy Phone:  [phone]  Filling Pharmacy Fax:  [phone]  Start Date:  7/1/2023

## 2023-07-09 ENCOUNTER — HOSPITAL ENCOUNTER (EMERGENCY)
Facility: CLINIC | Age: 43
Discharge: HOME | End: 2023-07-09
Attending: PHYSICIAN ASSISTANT | Admitting: PHYSICIAN ASSISTANT
Payer: COMMERCIAL

## 2023-07-09 VITALS
OXYGEN SATURATION: 96 % | SYSTOLIC BLOOD PRESSURE: 126 MMHG | DIASTOLIC BLOOD PRESSURE: 64 MMHG | RESPIRATION RATE: 16 BRPM | TEMPERATURE: 98.2 F | HEART RATE: 72 BPM

## 2023-07-09 DIAGNOSIS — R20.2 NUMBNESS AND TINGLING IN LEFT HAND: ICD-10-CM

## 2023-07-09 DIAGNOSIS — R20.0 NUMBNESS AND TINGLING IN LEFT HAND: ICD-10-CM

## 2023-07-09 DIAGNOSIS — M79.602 LEFT ARM PAIN: ICD-10-CM

## 2023-07-09 DIAGNOSIS — M77.10 LATERAL EPICONDYLITIS: ICD-10-CM

## 2023-07-09 PROCEDURE — 99213 OFFICE O/P EST LOW 20 MIN: CPT | Performed by: PHYSICIAN ASSISTANT

## 2023-07-09 PROCEDURE — G0463 HOSPITAL OUTPT CLINIC VISIT: HCPCS | Performed by: PHYSICIAN ASSISTANT

## 2023-07-09 RX ORDER — METHYLPREDNISOLONE 4 MG
TABLET, DOSE PACK ORAL
Qty: 21 TABLET | Refills: 0 | Status: SHIPPED | OUTPATIENT
Start: 2023-07-09 | End: 2023-09-14

## 2023-07-09 ASSESSMENT — ACTIVITIES OF DAILY LIVING (ADL): ADLS_ACUITY_SCORE: 35

## 2023-07-09 ASSESSMENT — ENCOUNTER SYMPTOMS
WEAKNESS: 1
COLOR CHANGE: 0
NECK STIFFNESS: 1
NUMBNESS: 1
CONSTITUTIONAL NEGATIVE: 1

## 2023-07-09 NOTE — ED TRIAGE NOTES
Pt reports tennis elbow x2 months and fells like she is losing circulation in the left arm with tingling numb sensation. States that she is dropping items when using the left arm.

## 2023-07-09 NOTE — ED PROVIDER NOTES
"  History     Chief Complaint   Patient presents with     Arm Problem     HPI  Alma Quinn is a 43 year old female who presents with ongoing lateral left elbow pain for the past two months, which has since progressively worsened within the past two days with intermittent numbness/tingling in her left hand. Patient states she has been dropping items and feels like she cant get a good . Her pain worsens with rotating of the elbow and lifting her arm above her head. Patient notes moderate left shoulder pain when lifting her arm as well. Left-sided neck pain which is not new for her. Patient types on the computer often and feels like that exacerbates her symptoms as well and states she does have carpal tunnel in this hand as well. She has been using Voltaren topical cream with little to no pain relief.  She is ambidextrous.  No chest pain or shortness of breath.  No arm swelling.      Allergies:  Allergies   Allergen Reactions     Banana Extract Allergy Skin Test Other (See Comments) and Rash     Kiwi, avocado     Adhesive Tape Other (See Comments)     Open sores.     Kiwi Other (See Comments)     Melon Itching     Nsaids Other (See Comments)     H/o anne-n-y gastric bypass\". AVOID NSAIDs and aspirin due to risk of gastric and/or G-J anastomotic ulcers. If Alma must be on short course of NSAIDs or aspirin, use enteric coated if possible and use PPI // Selina Lazar RN, Bariatric Nurse Clinician, [facility] 7/8/2022     Nuts Hives     Shellfish Allergy      Avocado Rash     Banana Rash     Kiwi, avocado     Latex Rash     Pcn [Penicillins] Rash     Plaquenil [Hydroxychloroquine] Rash     Sulfanilamide Rash       Problem List:    Patient Active Problem List    Diagnosis Date Noted     Bipolar 1 disorder (H) 04/13/2011     Priority: High     Sees psychiatric nurse, Margaux Azul NP with [facility]       Encinas's esophagus determined by endoscopy 02/01/2022     " Priority: Medium     Morbid obesity (H) 01/24/2020     Priority: Medium     GERD (gastroesophageal reflux disease) 10/19/2012     Priority: Medium     S/P gastrectomy 07/11/2012     Priority: Medium     Overview:   Lap Sleeve Gastrectomy by Dr. Ephraim Blood from bariatric surgery. DOS 7/11/2012 at [facility], [place], [place]       Lumbar radiculopathy 11/04/2011     Priority: Medium     Acute bilateral leg weakness 2/11 and found to have L5-S1 herniated disk with compression of left L5 nerve root.    S/p left L5-S1 hemilaminectomy and microdiscectomy 2/24/11.  Continues to have residual left leg weakness and pain       Weakness of left leg 11/04/2011     Priority: Medium     Fibromyalgia 05/23/2011     Priority: Medium     DDD (degenerative disc disease), lumbar 04/13/2011     Priority: Medium     MRI 2/11- L5-S1 large central posterior extruded disc herniation extending inferior to the disc level, causing slight impression on  thecal sac and left S1 nerve root sleeve. S/p Left L5-S1 hemilaminectomy discectomy.        SAMEER (obstructive sleep apnea) 06/17/2010     Priority: Medium     Not tolerating CPAP, has deviated septum - referred to ENT in 12/09 and prescribed flonase       Seasonal allergic rhinitis 06/17/2010     Priority: Medium     Intermittent asthma 06/17/2010     Priority: Medium     Uses inhaled steroid in fall, typically       Chronic pain disorder 06/17/2010     Priority: Medium     Started in 2008 3 months postpartum.  Pain in her entire body with muscle cramping- arms, legs, elbows, knees, ankles, hips, shoulders, fingers etc.  Has been on multiple meds icluding gabapentin, cymbalta, NSAIDs, flexeril and narcotics with minimal benefit.  High doses of prednisone helped some.  Insurance would not cover lyrica.  Mild elevation of ESR to ~30 and CRP to ~24.  RF was 14 (normal 0-14).  CCP was negative  MRI 4/09:  Degenerative changes at L5-S1, mod to mild corby foraminal narrowing with mild facet  arthropathy, no nerve impingement  Negative ehrlichia and lyme testing in   Mildly decreased vitamin D level at 20 in 10/08 - corrected to 25 in , then put on 50,000 units/week  Vitamin D level 5/10 was 16  ? Fibromyalgia vs chronic pain syndrome vs vitamin D deficiency?  Normal hand and foot xrays   Seen by Dr. Gildardo Villeda, rheumatology 6/10 - lab w/u and exam negative for RA.  Vitamin D levels low - recommended vitamin D3 4000 units/day x 8 weeks, then 2000 units/day.  Also referral to PT for iliotibial band syndrome and corby knee pain.  Also referral to rheumatology nursing associates for fibromyalgia.  Also emphasized need to treat SAMEER and possible bariatric surgery.       PCOS (polycystic ovarian syndrome) 2010     Priority: Medium     Generalized osteoarthrosis, involving multiple sites 2010     Priority: Medium     Bipolar I disorder (H) 2010     Priority: Medium     Status post bariatric surgery 2009     Priority: Medium     Posttraumatic stress disorder 2008     Priority: Medium     CARDIOVASCULAR SCREENING; LDL GOAL LESS THAN 160 10/31/2010     Priority: Low        Past Medical History:    Past Medical History:   Diagnosis Date     Cystic kidney disease      Depression      PONV (postoperative nausea and vomiting)      Post traumatic stress disorder      Seasonal allergies      Toxemia      Uncomplicated asthma        Past Surgical History:    Past Surgical History:   Procedure Laterality Date     ADENOIDECTOMY       APPENDECTOMY       ARTHROSCOPY KNEE WITH MEDIAL MENISCECTOMY  2014    Procedure: ARTHROSCOPY KNEE WITH MEDIAL MENISCECTOMY;  Surgeon: Arron Curtis MD;  Location: [facility]     ARTHROSCOPY KNEE WITH MEDIAL MENISCECTOMY Left 2015    Procedure: ARTHROSCOPY KNEE WITH MEDIAL MENISCECTOMY;  Surgeon: Arron Curtis MD;  Location: [facility] OR     C/SECTION, CLASSICAL      , Classical     CHOLECYSTECTOMY, LAPOROSCOPIC  10/07     Cholecystectomy, Laparoscopic     HEMILAMINECTOMY, DISCECTOMY LUMBAR TWO LEVELS, COMBINED  11    Left L5-S1 hemilaminectomy for discectomy      LAPAROSCOPIC GASTRIC SLEEVE  12    lap sleeve gastrectomy     SURGICAL HISTORY OF 1980    Kidney Surgery     SURGICAL HISTORY OF     Urethral implants     TONSILLECTOMY         Family History:    Family History   Problem Relation Age of Onset     Gastrointestinal Disease Mother         chrohns     Diabetes Father      Cancer Father         had cancer  between lining of lungs     Cardiovascular Father 57        4 valves replaced     Lupus Father      Breast Cancer Maternal Grandmother      Respiratory Maternal Grandfather         COPD     Diabetes Paternal Grandmother      C.A.D. Paternal Grandmother      Asthma Paternal Grandmother      Diabetes Paternal Grandfather      Prostate Cancer Paternal Grandfather      Asthma Son      Asthma Daughter        Social History:  Marital Status:   [2]  Social History     Tobacco Use     Smoking status: Former     Packs/day: 0.50     Years: 15.00     Pack years: 7.50     Types: Cigarettes     Quit date: 2014     Years since quittin.0     Smokeless tobacco: Never     Tobacco comments:     1/4 pack daily   Vaping Use     Vaping Use: Never used   Substance Use Topics     Alcohol use: No     Drug use: No        Medications:    methylPREDNISolone (MEDROL DOSEPAK) 4 MG tablet therapy pack  Acetaminophen (TYLENOL DISSOLVE PACKS) 500 MG PACK  albuterol (PROAIR HFA/PROVENTIL HFA/VENTOLIN HFA) 108 (90 Base) MCG/ACT inhaler  albuterol (PROAIR HFA/PROVENTIL HFA/VENTOLIN HFA) 108 (90 Base) MCG/ACT inhaler  albuterol (PROVENTIL) (2.5 MG/3ML) 0.083% neb solution  buPROPion (WELLBUTRIN XL) 300 MG 24 hr tablet  Calcium-Magnesium-Zinc 333-133-5 MG TABS per tablet  Cholecalciferol (VITAMIN D3) 20991 UNIT CAPS  EPINEPHrine (ANY BX GENERIC EQUIV) 0.3 MG/0.3ML injection 2-pack  FLOVENT  MCG/ACT inhaler  hydrOXYzine  (VISTARIL) 50 MG capsule  levonorgestrel (MIRENA) 20 MCG/DAY IUD  melatonin 5 MG tablet  metFORMIN (GLUCOPHAGE-XR) 750 MG 24 hr tablet  methocarbamol (ROBAXIN) 500 MG tablet  montelukast (SINGULAIR) 10 MG tablet  multivitamin w/minerals (THERA-VIT-M) tablet  ondansetron (ZOFRAN) 4 MG tablet  ORDER FOR DME  TURMERIC PO  vitamin B complex with vitamin C (STRESS TAB) tablet          Review of Systems   Constitutional: Negative.    Musculoskeletal: Positive for neck stiffness.        L shoulder and elbow pain    Skin: Negative for color change and rash.   Neurological: Positive for weakness and numbness (L hand).   All other systems reviewed and are negative.      Physical Exam   BP: 126/64  Pulse: 72  Temp: 98.2  F (36.8  C)  Resp: 16  SpO2: 96 %      Physical Exam  Constitutional:       General: She is not in acute distress.     Appearance: Normal appearance. She is not ill-appearing, toxic-appearing or diaphoretic.   HENT:      Head: Normocephalic and atraumatic.   Eyes:      Extraocular Movements: Extraocular movements intact.      Conjunctiva/sclera: Conjunctivae normal.      Pupils: Pupils are equal, round, and reactive to light.   Cardiovascular:      Pulses: Normal pulses.   Pulmonary:      Effort: Pulmonary effort is normal.   Musculoskeletal:      Right shoulder: Normal.      Left shoulder: No swelling, deformity, effusion, tenderness, bony tenderness or crepitus. Decreased range of motion. Normal strength. Normal pulse.      Right upper arm: Normal.      Left upper arm: Normal.      Right elbow: Normal.      Left elbow: No swelling, deformity, effusion or lacerations. Decreased range of motion. Tenderness present in lateral epicondyle. No radial head, medial epicondyle or olecranon process tenderness.      Right forearm: Normal.      Left forearm: Normal.      Right wrist: Normal.      Left wrist: Normal.      Right hand: Normal.      Left hand: Tenderness present. No swelling, deformity, lacerations or bony  tenderness. Normal range of motion. Normal strength. Normal sensation. Normal capillary refill. Normal pulse.      Cervical back: Normal range of motion and neck supple. Tenderness present. No swelling, edema, erythema, signs of trauma, rigidity, spasms or crepitus. Pain with movement and muscular tenderness (left-sided) present. No spinous process tenderness. Normal range of motion.   Skin:     General: Skin is warm and dry.      Capillary Refill: Capillary refill takes less than 2 seconds.      Findings: No bruising, erythema, lesion or rash.   Neurological:      General: No focal deficit present.      Mental Status: She is alert and oriented to person, place, and time.      Cranial Nerves: No cranial nerve deficit.      Sensory: Sensation is intact. No sensory deficit.      Motor: Motor function is intact. No weakness.      Coordination: Coordination normal.      Gait: Gait normal.         ED Course             No results found for this or any previous visit (from the past 24 hour(s)).    Medications - No data to display    Assessments & Plan (with Medical Decision Making)     I have reviewed the nursing notes.    I have reviewed the findings, diagnosis, plan and need for follow up with the patient.  A 43-year-old female with a pertinent history of fibromyalgia, carpal tunnel, and lumbar spinal fusion presenting with ongoing left lateral elbow pain for the past two months, which has since progressively increased with intermittent numbness/tingling in her left hand. Patient is alert and in no acute distress in clinic today. On exam, she has tenderness to palpation of the lateral epicondyle and pain with active and passive flexion and extension of the elbow. Positive handshake exam consistent with lateral epicondylitis. Cannot rule out associated subacromial or supraspinatus impingement as patient also has decreased ROM on active flexion of the left shoulder with positive impingement, ortiz, and apprehension  tests. Full ROM and 5/5 strength in left wrist; however cannot rule out a worsening carpal tunnel as patients numbness is consistent with median nerve distribution. Plan is to send patient home with a tennis elbow band for her elbow to minimize pain and rotating movements. Continue with Voltaren topical for pain prn. Will trial Medrol dosepak as there may also be a cervical radicular component to her symptoms.  Discussed follow-up with orthopedics and OT (referral placed). Patient understands and is in agreement with the plan. Return precautions reviewed.      I, Kamini Boyle, have reviewed and discussed with the advanced practice provider student, Li CHISHOLM, their history, physical, and plan for Alma Quinn. I participated in a shared visit by interviewing and examining the patient as well.  I have reviewed, added to, and edited the note as necessary.    Kamini Boyle PA-C    Date of Service (when I saw the patient): 07/09/23  I personally evaluated this patient today.      Discharge Medication List as of 7/9/2023  3:55 PM          Final diagnoses:   Lateral epicondylitis   Numbness and tingling in left hand   Left arm pain       7/9/2023   [facility] EMERGENCY DEPT      Disclaimer:  This note consists of symbols derived from keyboarding, dictation and/or voice recognition software.  As a result, there may be errors in the script that have gone undetected.  Please consider this when interpreting information found in this chart.     Kamini Boyle PA-C  07/09/23 1738

## 2023-07-11 NOTE — TELEPHONE ENCOUNTER
Central Prior Authorization Team - Phone: [phone]     PA Initiation    Medication: FLOVENT  MCG/ACT IN AERO  Insurance Company: [employer] - Phone [phone] Fax [phone]  Pharmacy Filling the Rx: [id number] [place] - [address] AT [place] [address] [address]  Filling Pharmacy Phone: [phone]  Filling Pharmacy Fax:    Start Date: 7/10/2023    PA started on 7/10/23, [employer] system down. Sent today.

## 2023-07-12 NOTE — TELEPHONE ENCOUNTER
Central Prior Authorization Team - Phone: [phone]     Prior Authorization Approval    Medication: FLOVENT  MCG/ACT IN AERO  Authorization Effective Date: 7/12/2023  Authorization Expiration Date: 7/12/2024  Approved Dose/Quantity: 1  Reference #:     Insurance Company: [employer] - Phone [phone] Fax [phone]  Expected CoPay:       CoPay Card Available:      Financial Assistance Needed:   Which Pharmacy is filling the prescription: [id number] - [place], [place] - [address] AT [place] [address] & [address]  Pharmacy Notified: Yes  Patient Notified: Yes pharmacy will notify when ready

## 2023-07-17 ENCOUNTER — THERAPY VISIT (OUTPATIENT)
Dept: OCCUPATIONAL THERAPY | Facility: CLINIC | Age: 43
End: 2023-07-17
Attending: PHYSICIAN ASSISTANT
Payer: COMMERCIAL

## 2023-07-17 DIAGNOSIS — M79.602 LEFT ARM PAIN: ICD-10-CM

## 2023-07-17 DIAGNOSIS — R20.2 NUMBNESS AND TINGLING IN LEFT HAND: ICD-10-CM

## 2023-07-17 DIAGNOSIS — R20.0 NUMBNESS AND TINGLING IN LEFT HAND: ICD-10-CM

## 2023-07-17 DIAGNOSIS — M77.10 LATERAL EPICONDYLITIS: ICD-10-CM

## 2023-07-17 PROCEDURE — 97535 SELF CARE MNGMENT TRAINING: CPT | Mod: GO | Performed by: OCCUPATIONAL THERAPIST

## 2023-07-17 PROCEDURE — 97140 MANUAL THERAPY 1/> REGIONS: CPT | Mod: GO | Performed by: OCCUPATIONAL THERAPIST

## 2023-07-17 PROCEDURE — 97110 THERAPEUTIC EXERCISES: CPT | Mod: GO | Performed by: OCCUPATIONAL THERAPIST

## 2023-07-17 PROCEDURE — 97165 OT EVAL LOW COMPLEX 30 MIN: CPT | Mod: GO | Performed by: OCCUPATIONAL THERAPIST

## 2023-07-17 NOTE — PROGRESS NOTES
OCCUPATIONAL THERAPY EVALUATION  Type of Visit: Evaluation    See electronic medical record for Abuse and Falls Screening details.    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 She says she has been having left elbow pain for the past 2 1/2 months . She relates she has had carpal tunnel on and off in left for many years.  . She relates she feels weak and unable to hold onto things. Hard to open jars and reach. . Reaching out to shut car door- bothersome.       Presenting condition or subjective complaint:    Date of onset: 04/01/23    Relevant medical history:   MI, Fx's hands fingers toes ankle, Left CTS, chronic auto-immune problem ( unsure of diagnosis) deals with chronic pain      Prior diagnostic imaging/testing results:    none   Prior therapy history for the same diagnosis, illness or injury:    none, tens, counterforce brace    Prior Level of Function  Transfers: Independent  Ambulation: Independent  ADL: Independent  IADL: Independent    Living Environment        Employment:    clinical manager  Hobbies/Interests:  crocheting, dani, knitting, kids     Patient goals for therapy:  To learn how to manage pain    Pain assessment: See objective evaluation for additional pain details     Objective   ADDITIONAL HISTORY:  Right hand dominant  Patient reports symptoms of pain, weakness/loss of strength, edema, numbness, and tingling   Transportation: drives  Currently working in normal job without restrictions    Functional Outcome Measure:   UEFI 40    PAIN:  Pain Level at Rest: 4/10  Pain Level with Use: 10/10  Pain Location: shoulder, elbow, wrist, and hand  Pain Quality: Aching, Shooting, and Throbbing  Pain Frequency: intermittent  Pain is Worst: daytime  Pain is Exacerbated By: reaching, lifting, carrying, pushing, lying on arm, gripping, twisting, activity /movement  Pain is Relieved By: cold  Pain Progression: Unchanged  Can have increased pain at night  Pain also better  with  counterforce brace wear  POSTURE: Normal     EDEMA: None     SCAR/WOUND:     SENSATION: Decreased Ulnar Nerve distribution per pt report     SCREENS: generally negative with neck motion, not further looked at today but patient reports that she does have cervical issues     ROM:  ETL- right  55, left 5 , other motions , WNL    OBSERVATIONS/APPEARANCE: ETL- right  55, left 5     SPECIAL TESTS:   Cumulative Trauma Special Tests  Pain Report Left Right   Elbow Flexion Test Positive after 5 seconds    Tinel's Cubital Tunnel Neg , however is sore with palpation to cubital tunnel        NEURAL TENSION TESTING: UNT: Ulnar Neurodynamic Test (based on DS Juancarlos's ULNT)   7/17/2023   0-5 Scale 0   Position:   0/5: Arm across abdomen in coronal plane  1/5: ER to neutral ABD shoulder to 45 degrees  2/5: ER shoulder to 90 degrees  3/5: Block shoulder and ABD shoulder to 110 degrees  4/5: Fully pronate forearm, extend wrist and ring and small fingers  5/5: Flex elbow and bring hand to side of face  Notes:  (+) indicates beyond grade level but less than custodial to next level  (-) indicates over custodial to level  S1 onset/change of patient's symptoms  S2 definite stop point based on patient's discomfort level    RESISTED TESTING: MMT 5/5  pain at lateral elbow with resistance to ECRB, wrist flexors, and forearm supination     STRENGTH:     Measured in pounds 7/17/2023 7/17/2023    Left Right   Trial 1 9 75   With extension 7/10 pain 75                 PALPATION: increased pain with palpation to lateral epicondyle, and PIN site           Assessment & Plan   CLINICAL IMPRESSIONS  Medical Diagnosis: Lateral epicondylitis (M77.10), Numbness and tingling in left hand (R20.0, R20.2), Left arm pain (M79.602)    Treatment Diagnosis: Pain- decreased ADL's IADL's    Impression/Assessment: Pt is a 43 year old female presenting to Occupational Therapy due to pain in her left non- dominant elbow.  The following significant findings  have been identified: Impaired ROM, Impaired sensation, Impaired strength, and Pain.  These identified deficits interfere with their ability to perform self care tasks, work tasks, recreational activities, household chores, and driving    as compared to previous level of function.     Clinical Decision Making (Complexity):  Assessment of Occupational Performance: 3-5 Performance Deficits  Occupational Performance Limitations: bathing/showering, dressing, hygiene and grooming, driving and community mobility, meal preparation and cleanup, sleep, work, and leisure activities  Clinical Decision Making (Complexity): Low complexity    PLAN OF CARE  Treatment Interventions:  Modalities: Infrared, Ultrasound  Interventions: Self-Care/Home Management, Therapeutic Exercise, Manual Therapy, Neuromuscular Re-education, Orthotic Fitting/Training    Long Term Goals   OT Goal 1  Goal Identifier: home program  Goal Description: Patient to be independent with home program, not needing more than 15% verbal or physical cuing to perform correctly  Rationale: In order to maximize safety and independence with performance of self-care activities  Target Date: 08/07/23  OT Goal 2  Goal Identifier: sleeping  Goal Description: Patient to score 3 or better on UEFI to be able to sleep through the night  Rationale: In order to maximize safety and independence with performance of self-care activities  Target Date: 08/22/23  OT Goal 3  Goal Identifier:   Goal Description: Increase  strength by 25# so patient can grab and carry things without dropping them  Rationale: In order to maximize safety and independence with performance of self-care activities  Target Date: 09/11/23      Frequency of Treatment: 1x/week  Duration of Treatment: 8 weeks     Recommended Referrals to Other Professionals: Physical Therapy  Education Assessment: Learner/Method: Patient;Listening;Reading;Demonstration;Pictures/Video;No Barriers to Learning  Education  Comments: PTRX on phone     Risks and benefits of evaluation/treatment have been explained.   Patient/Family/caregiver agrees with Plan of Care.     Evaluation Time:    OT Brayden, Low Complexity Minutes (03011): 25       Signing Clinician: NATALIA Willams/L CHT  Occupational Therapist, Certified Hand Therapist

## 2023-07-26 ENCOUNTER — OFFICE VISIT (OUTPATIENT)
Dept: FAMILY MEDICINE | Facility: CLINIC | Age: 43
End: 2023-07-26
Payer: COMMERCIAL

## 2023-07-26 VITALS
SYSTOLIC BLOOD PRESSURE: 120 MMHG | TEMPERATURE: 98.1 F | DIASTOLIC BLOOD PRESSURE: 80 MMHG | BODY MASS INDEX: 45.48 KG/M2 | RESPIRATION RATE: 20 BRPM | HEIGHT: 65 IN | HEART RATE: 63 BPM | WEIGHT: 273 LBS | OXYGEN SATURATION: 97 %

## 2023-07-26 DIAGNOSIS — S00.96XA INSECT BITE OF HEAD, UNSPECIFIED PART, INITIAL ENCOUNTER: Primary | ICD-10-CM
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
## 2023-07-26 DIAGNOSIS — F31.9 BIPOLAR AFFECTIVE DISORDER, REMISSION STATUS UNSPECIFIED (H): ICD-10-CM

## 2023-07-26 DIAGNOSIS — R59.1 LYMPHADENOPATHY: ICD-10-CM

## 2023-07-26 DIAGNOSIS — E66.01 MORBID OBESITY (H): ICD-10-CM

## 2023-07-26 PROCEDURE — 99213 OFFICE O/P EST LOW 20 MIN: CPT | Performed by: FAMILY MEDICINE

## 2023-07-26 RX ORDER — PREDNISONE 20 MG/1
40 TABLET ORAL DAILY
Qty: 10 TABLET | Refills: 0 | Status: SHIPPED | OUTPATIENT
Start: 2023-07-26 | End: 2023-09-14

## 2023-07-26 ASSESSMENT — PAIN SCALES - GENERAL: PAINLEVEL: MILD PAIN (3)

## 2023-07-26 ASSESSMENT — ASTHMA QUESTIONNAIRES: ACT_TOTALSCORE: 19

## 2023-07-26 NOTE — PROGRESS NOTES
Assessment & Plan     Insect bite of head, unspecified part, initial encounter  Lymphadenopathy  Seems to be reaction to black flies. Due to the extent of the bites and discomfort will proceed with oral steroids x 5 days. Discussed using vistaril and claritin as well.   -- Follow up if symptoms persist or worsen.   - predniSONE (DELTASONE) 20 MG tablet  Dispense: 10 tablet; Refill: 0    Bipolar affective disorder, remission status unspecified (H)  Known issue that I take into account for their medical decisions; no current exacerbations or new concerns.     Morbid obesity (H)  BMI 45.     The risks, benefits and treatment options of prescribed medications or other treatments have been discussed with the patient. The patient verbalized their understanding and should call or follow up if no improvement or if they develop further problems.        [facility] is a 43 year old, presenting for the following health issues:  Insect Bite          History of Present Illness       Reason for visit:  Reaction to insect bites  Symptom onset:  1-3 days ago  Symptoms include:  Swollen glands wheezing body aches nausea joint pain  Symptom intensity:  Moderate  Symptom progression:  Worsening  Had these symptoms before:  No  What makes it worse:  Not to my knowledge  What makes it better:  Tylenol    She eats 4 or more servings of fruits and vegetables daily.She consumes 1 sweetened beverage(s) daily.She exercises with enough effort to increase her heart rate 20 to 29 minutes per day.  She exercises with enough effort to increase her heart rate 5 days per week.   She is taking medications regularly.     Recently in [place]   Bitten by black flies while in [place].   Having bites in scalp, forehead and back.   Having pain in her neck glands.   No fevers.   No chest pain.   Bowels and bladders are fine.       Review of Systems     Objective    /80 (BP Location: Left  "arm, Patient Position: Chair, Cuff Size: Adult Large)   Pulse 63   Temp 98.1  F (36.7  C) (Tympanic)   Resp 20   Ht 1.651 m (5' 5\")   Wt 123.8 kg (273 lb)   LMP  (LMP Unknown)   SpO2 97%   BMI 45.43 kg/m    Body mass index is 45.43 kg/m .  Physical Exam   General: Alert, cooperative, no acute distress  Head: Normocephalic, atraumatic. Bite lesions present on the forehead, and scalp.    Eyes: PERRL, no scleral icterus, no conjunctival injection   Ears: External ear without deformity, external canals patent, TM intact with no signs of infection   Nose: nares patent  Throat: Oropharynx clear, non-erythematous, tonsils non-enlarged   Neck: cervical lymphadenopathy on the left. Mild tenderness to palpation. Mild cervical lymphadenopathy on the left.   CV: RRR, no murmur   Resp: non-labored breathing, clear to auscultation, no wheezing or rales.           "

## 2023-08-16 DIAGNOSIS — K21.9 GASTROESOPHAGEAL REFLUX DISEASE, UNSPECIFIED WHETHER ESOPHAGITIS PRESENT: ICD-10-CM

## 2023-08-16 RX ORDER — OMEPRAZOLE 40 MG/1
40 CAPSULE, DELAYED RELEASE ORAL DAILY
Qty: 90 CAPSULE | Refills: 3 | OUTPATIENT
Start: 2023-08-16

## 2023-08-19 DIAGNOSIS — K21.9 GASTROESOPHAGEAL REFLUX DISEASE, UNSPECIFIED WHETHER ESOPHAGITIS PRESENT: ICD-10-CM

## 2023-08-21 RX ORDER — OMEPRAZOLE 40 MG/1
40 CAPSULE, DELAYED RELEASE ORAL DAILY
Qty: 90 CAPSULE | Refills: 3 | OUTPATIENT
Start: 2023-08-21

## 2023-08-22 DIAGNOSIS — K21.9 GASTROESOPHAGEAL REFLUX DISEASE, UNSPECIFIED WHETHER ESOPHAGITIS PRESENT: ICD-10-CM

## 2023-08-22 RX ORDER — OMEPRAZOLE 40 MG/1
40 CAPSULE, DELAYED RELEASE ORAL DAILY
Qty: 90 CAPSULE | Refills: 3 | OUTPATIENT
Start: 2023-08-22

## 2023-08-22 NOTE — TELEPHONE ENCOUNTER
"Requested Prescriptions   Pending Prescriptions Disp Refills    omeprazole (PRILOSEC) 40 MG DR capsule [Pharmacy Med Name: omeprazole 40 mg capsule,delayed release] 90 capsule 3     Sig: Take 1 capsule (40 mg) by mouth daily       PPI Protocol Failed - 8/22/2023 12:48 PM        Failed - Medication is active on med list        Passed - Not on Clopidogrel (unless Pantoprazole ordered)        Passed - No diagnosis of osteoporosis on record        Passed - Recent (12 mo) or future (30 days) visit within the authorizing provider's specialty     Patient has had an office visit with the authorizing provider or a provider within the authorizing providers department within the previous 12 mos or has a future within next 30 days. See \"Patient Info\" tab in inbasket, or \"Choose Columns\" in Meds & Orders section of the refill encounter.              Passed - Patient is age 18 or older        Passed - No active pregnacy on record        Passed - No positive pregnancy test in past 12 months           Routing refill request to provider for review/approval because:  Drug not active on patient's medication list    Michaelle Alonso RN on 8/22/2023 at 3:54 PM    "

## 2023-09-05 NOTE — LETTER
Medication famotidine 20 mg    [facility] [address] [id number] - [place], [place] - [address] [facility]    PCP/Speciality GI    Enough for 3 days - YES [facility]  [address]  [facility] [address]  [phone]          6/20/2022          To Whom it May Concern:     Alma Quinn, female, 1980 has had a mantoux on 5/17/22 at 9:38am and read on 5/20 at 9:10am.      Mantoux result is NEGATIVE:  Lab Results   Component Value Date    PPDREDNESS Not Present 06/20/2022    PPDINDURATIO 0 06/20/2022         Please contact me for questions or concerns.        Sincerely,    Kathy Padilla, BSN, RN, PHN

## 2023-09-09 ENCOUNTER — HEALTH MAINTENANCE LETTER (OUTPATIENT)
Age: 43
End: 2023-09-09

## 2023-09-14 ENCOUNTER — VIRTUAL VISIT (OUTPATIENT)
Dept: FAMILY MEDICINE | Facility: CLINIC | Age: 43
End: 2023-09-14
Payer: COMMERCIAL

## 2023-09-14 ENCOUNTER — IMMUNIZATION (OUTPATIENT)
Dept: FAMILY MEDICINE | Facility: CLINIC | Age: 43
End: 2023-09-14
Payer: COMMERCIAL

## 2023-09-14 DIAGNOSIS — M62.830 BACK MUSCLE SPASM: ICD-10-CM

## 2023-09-14 DIAGNOSIS — R73.03 PREDIABETES: ICD-10-CM

## 2023-09-14 DIAGNOSIS — J45.41 MODERATE PERSISTENT ASTHMA WITH EXACERBATION: Primary | ICD-10-CM

## 2023-09-14 DIAGNOSIS — M51.369 DDD (DEGENERATIVE DISC DISEASE), LUMBAR: ICD-10-CM

## 2023-09-14 DIAGNOSIS — F31.9 BIPOLAR AFFECTIVE DISORDER, REMISSION STATUS UNSPECIFIED (H): ICD-10-CM

## 2023-09-14 PROCEDURE — 90686 IIV4 VACC NO PRSV 0.5 ML IM: CPT

## 2023-09-14 PROCEDURE — 90471 IMMUNIZATION ADMIN: CPT

## 2023-09-14 PROCEDURE — 99214 OFFICE O/P EST MOD 30 MIN: CPT | Mod: 93 | Performed by: NURSE PRACTITIONER

## 2023-09-14 RX ORDER — METHOCARBAMOL 500 MG/1
500 TABLET, FILM COATED ORAL 4 TIMES DAILY PRN
Qty: 90 TABLET | Refills: 3 | Status: SHIPPED | OUTPATIENT
Start: 2023-09-14 | End: 2024-09-17

## 2023-09-14 RX ORDER — METFORMIN HYDROCHLORIDE 750 MG/1
750 TABLET, EXTENDED RELEASE ORAL
Qty: 90 TABLET | Refills: 1 | Status: SHIPPED | OUTPATIENT
Start: 2023-09-14 | End: 2024-04-05

## 2023-09-14 RX ORDER — ALBUTEROL SULFATE 90 UG/1
AEROSOL, METERED RESPIRATORY (INHALATION)
Qty: 18 G | Refills: 11 | Status: SHIPPED | OUTPATIENT
Start: 2023-09-14 | End: 2024-09-17

## 2023-09-14 RX ORDER — BUPROPION HYDROCHLORIDE 300 MG/1
300 TABLET ORAL EVERY MORNING
Qty: 90 TABLET | Refills: 4 | Status: SHIPPED | OUTPATIENT
Start: 2023-09-14 | End: 2024-09-17

## 2023-09-14 RX ORDER — MONTELUKAST SODIUM 10 MG/1
10 TABLET ORAL AT BEDTIME
Qty: 90 TABLET | Refills: 3 | Status: SHIPPED | OUTPATIENT
Start: 2023-09-14 | End: 2024-09-17

## 2023-09-14 RX ORDER — ALBUTEROL SULFATE 0.83 MG/ML
2.5 SOLUTION RESPIRATORY (INHALATION) EVERY 4 HOURS PRN
Qty: 300 ML | Refills: 3 | Status: SHIPPED | OUTPATIENT
Start: 2023-09-14 | End: 2024-09-17

## 2023-09-14 RX ORDER — ALBUTEROL SULFATE 90 UG/1
AEROSOL, METERED RESPIRATORY (INHALATION)
Qty: 1 G | Refills: 0 | Status: CANCELLED | OUTPATIENT
Start: 2023-09-14

## 2023-09-14 RX ORDER — FLUTICASONE PROPIONATE AND SALMETEROL XINAFOATE 230; 21 UG/1; UG/1
2 AEROSOL, METERED RESPIRATORY (INHALATION) 2 TIMES DAILY
Qty: 12 G | Refills: 4 | Status: SHIPPED | OUTPATIENT
Start: 2023-09-14 | End: 2024-03-06

## 2023-09-14 ASSESSMENT — ASTHMA QUESTIONNAIRES: ACT_TOTALSCORE: 19

## 2023-09-14 NOTE — PROGRESS NOTES
"Donna is a 43 year old who is being evaluated via a billable telephone visit.      What phone number would you like to be contacted at? [phone]  How would you like to obtain your AVS? Jose    Distant Location (provider location):  On-site    Assessment & Plan     Moderate persistent asthma with exacerbation  Uncontrolled, stop Flovent and trial Advair instead  - albuterol (PROVENTIL) (2.5 MG/3ML) 0.083% neb solution; Take 1 vial (2.5 mg) by nebulization every 4 hours as needed for shortness of breath or wheezing  - albuterol (PROAIR HFA/PROVENTIL HFA/VENTOLIN HFA) 108 (90 Base) MCG/ACT inhaler; INHALE 2 PUFFS INTO THE LUNGS EVERY 4 HOURS AS NEEDED FOR SHORTNESS OF BREATH OR DIFFICULT BREATHING  - montelukast (SINGULAIR) 10 MG tablet; Take 1 tablet (10 mg) by mouth At Bedtime  - fluticasone-salmeterol (ADVAIR-HFA) 230-21 MCG/ACT inhaler; Inhale 2 puffs into the lungs 2 times daily    Bipolar affective disorder, remission status unspecified (H)    - buPROPion (WELLBUTRIN XL) 300 MG 24 hr tablet; Take 1 tablet (300 mg) by mouth every morning    DDD (degenerative disc disease), lumbar    - methocarbamol (ROBAXIN) 500 MG tablet; Take 1 tablet (500 mg) by mouth 4 times daily as needed for muscle spasms    Back muscle spasm    - methocarbamol (ROBAXIN) 500 MG tablet; Take 1 tablet (500 mg) by mouth 4 times daily as needed for muscle spasms    Prediabetes    - metFORMIN (GLUCOPHAGE-XR) 750 MG 24 hr tablet; Take 1 tablet (750 mg) by mouth daily (with dinner)  - Hemoglobin A1c; Future  - Basic metabolic panel  (Ca, Cl, CO2, Creat, Gluc, K, Na, BUN); Future             BMI:   Estimated body mass index is 45.43 kg/m  as calculated from the following:    Height as of 7/26/23: 1.651 m (5' 5\").    Weight as of 7/26/23: 123.8 kg (273 lb).         FUTURE APPOINTMENTS:       - Follow-up visit in for annual or as needed. Schedule lab draw.     DELANO Lin CNP  [facility]    Subjective "   Donna is a 43 year old, presenting for the following health issues:  Patient Request (Handicap form renewal)        9/14/2023     1:15 PM   Additional Questions   Roomed by Daisy AMEZQUITA       History of Present Illness     Asthma:  She presents for follow up of asthma.  She has no cough, some wheezing, and no shortness of breath.  She is using a relief medication a few times a week. She does not miss any doses of her controller medication throughout the week. Patient is aware of the following triggers: dust mites, pollens and smoke. The patient has not had a visit to the Emergency Room, Urgent Care or Hospital due to asthma since the last clinic visit.     Back Pain:  She presents for follow up of back pain. Patient's back pain is a chronic problem.  Location of back pain:  Right lower back, left lower back, left side of neck, right buttock and left buttock  Description of back pain: dull ache  Back pain spreads: left thigh    Since patient first noticed back pain, pain is: always present, but gets better and worse  Does back pain interfere with her job:  Yes       She eats 4 or more servings of fruits and vegetables daily.She consumes 1 sweetened beverage(s) daily.She exercises with enough effort to increase her heart rate 10 to 19 minutes per day.  She exercises with enough effort to increase her heart rate 4 days per week. She is missing 1 dose(s) of medications per week.  She is not taking prescribed medications regularly due to other.         Medication Followup of metformin  Taking Medication as prescribed: NO-stopped prior to procedure and no one has started her on it since  Side Effects:  None  Medication Helping Symptoms:  not applicable    Medication Followup of Robaxin  Taking Medication as prescribed: yes  Side Effects:  None  Medication Helping Symptoms:  yes      Medication Followup of Wellbutrin  Taking Medication as prescribed: yes  Side Effects:  None  Medication Helping Symptoms:  yes    Review of  Systems   Constitutional, HEENT, cardiovascular, pulmonary, gi and gu systems are negative, except as otherwise noted.      Objective           Vitals:  No vitals were obtained today due to virtual visit.    Physical Exam   alert and no distress  PSYCH: Alert and oriented times 3; coherent speech, normal   rate and volume, able to articulate logical thoughts, able   to abstract reason, no tangential thoughts, no hallucinations   or delusions  Her affect is normal and pleasant  RESP: No cough, no audible wheezing, able to talk in full sentences  Remainder of exam unable to be completed due to telephone visits                Phone call duration: 25 minutes

## 2023-09-23 DIAGNOSIS — K21.9 GASTROESOPHAGEAL REFLUX DISEASE, UNSPECIFIED WHETHER ESOPHAGITIS PRESENT: ICD-10-CM

## 2023-09-25 RX ORDER — OMEPRAZOLE 40 MG/1
40 CAPSULE, DELAYED RELEASE ORAL DAILY
Qty: 90 CAPSULE | Refills: 3 | OUTPATIENT
Start: 2023-09-25

## 2023-10-17 DIAGNOSIS — K21.9 GASTROESOPHAGEAL REFLUX DISEASE, UNSPECIFIED WHETHER ESOPHAGITIS PRESENT: ICD-10-CM

## 2023-10-18 NOTE — TELEPHONE ENCOUNTER
Pending Prescriptions:                       Disp   Refills    omeprazole (PRILOSEC) 40 MG DR capsule [P*90 cap*3            Sig: Take 1 capsule (40 mg) by mouth daily    Routing refill request to provider for review/approval because:  Drug not active on patient's medication list  Medication is reported/historical      Jose Armando Ahumada RN

## 2023-10-19 RX ORDER — OMEPRAZOLE 40 MG/1
40 CAPSULE, DELAYED RELEASE ORAL DAILY
Qty: 90 CAPSULE | Refills: 3 | Status: SHIPPED | OUTPATIENT
Start: 2023-10-19 | End: 2024-08-26

## 2023-11-14 NOTE — PROGRESS NOTES
DISCHARGE  Reason for Discharge:     Equipment Issued:     Discharge Plan: Patient to continue home program.    Referring Provider:  Kamini Boyle Note: This is a copy of patient's last daily visit and will also serve as their Discharge Summary as they have not been in for further treatment 30 days past this date. Final assessment of goals and physical and functional status , therefore unavailable.    07/17/23 0500   Appointment Info   Treating Provider Analilia Bond, OTR/L CHT   Visits Used 1   Medical Diagnosis Lateral epicondylitis (M77.10), Numbness and tingling in left hand (R20.0, R20.2), Left arm pain (M79.602)   OT Tx Diagnosis Pain- decreased ADL's IADL's   Progress Note/Certification   Onset of Illness/Injury or Date of Surgery 04/01/23   Therapy Frequency 1x/week   Predicted Duration 8 weeks   Goals   OT Goals 1;2;3   OT Goal 1   Goal Identifier home program   Goal Description Patient to be independent with home program, not needing more than 15% verbal or physical cuing to perform correctly   Rationale In order to maximize safety and independence with performance of self-care activities   Target Date 08/07/23   OT Goal 2   Goal Identifier sleeping   Goal Description Patient to score 3 or better on UEFI to be able to sleep through the night   Rationale In order to maximize safety and independence with performance of self-care activities   Target Date 08/22/23   OT Goal 3   Goal Identifier    Goal Description Increase  strength by 25# so patient can grab and carry things without dropping them   Rationale In order to maximize safety and independence with performance of self-care activities   Target Date 09/11/23   Subjective Report   Subjective Report see Eval   Objective Measures   Objective Measures Objective Measure 1;Objective Measure 2   Objective Measure 1   Objective Measure see Eval   Treatment Interventions (OT)   Interventions Self Care/Home Management;Therapeutic  Procedure/Exercise;Manual Therapy   Self Care/Home Management   Self Care 1 education on Eval findings and activity modification, ergonomic considerations to avoid and do differently, sleeping, using computer, driving ect   Self-Care/Home Mgmt/ADL, Compensatory, Meal Prep Minutes (63595) 10 Minutes   Therapeutic Procedure/Exercise   Therapeutic Procedure: strength, endurance, ROM, flexibility minutes (54254) 10   Skilled Intervention teaching HEP instruction   Ther Proc 1 see PTRX- put ex on phone,   Ther Proc 1 - Details Exercise Name: TENNIS ELBOW PREVENTION  Exercise Name: Healthy Computer Use  Exercise Name: [facility] Er[employer]onom[employer]  Exercise Name: Sitting Posture at Computer  Exercise Name: Friction Massage - Sessions: 5 min a day  Exercise Name: Ball Massage to Extensors, Notes: A Lacrosse ball works the best . Can put the ball on a wall and lean into it as well. 5 min a day  Exercise Name: Elbow Passive Range of Motion Artisan Stretch, Sets: 1 set - Reps: 5 reps - Sessions: 4x/day, Notes: Should be relatively painfree- arms at side  bend fingers, wrist, elbows and twist and then unwind into starting position  Exercise Name: Forearm Passive Range of Motion Extensor Stretch With Nerve on Slack  Exercise Name: Cubital Tunnel Prevention  Exercise Name: Nerve Flossing Highly Irritable Ulnar Nerve - Cubital Tunnel   Manual Therapy   Manual Therapy Minutes (55608) 8   Manual Therapy 1 taught home friction and ball massage - see above   Eval/Assessments   OT Eval, Low Complexity Minutes (31122) 25   Education   Learner/Method Patient;Listening;Reading;Demonstration;Pictures/Video;No Barriers to Learning   Education Comments PTRX on phone   Plan   Home program [id number] Name: TENNIS ELBOW PREVENTION Exercise Name: Healthy Computer Use Exercise Name: [facility] [employer] Exercise Name: Sitting Posture at Computer Exercise Name: Friction Massage - Sessions: 5 min a day Exercise Name: Ball Massage to Extensors,  Notes: A Lacrosse ball works the best . Can put the ball on a wall and lean into it as well. 5 min a day Exercise Name: Elbow Passive Range of Motion Artisan Stretch, Sets: 1 set - Reps: 5 reps - Sessions: 4x/day, Notes: Should be relatively painfree- arms at side bend fingers, wrist, elbows and twist and then unwind into starting position Exercise Name: Forearm Passive Range of Motion Extensor Stretch With Nerve on Slack Exercise Name: Cubital Tunnel Prevention Exercise Name: Nerve Flossing Highly Irritable Ulnar Nerve - Cubital Tunnel, activity modification, counterforce brace wear as helpful with activity and night splinting   Plan for next session UD, man to lateral elbow, check HEP   Total Session Time

## 2024-01-11 ENCOUNTER — TELEPHONE (OUTPATIENT)
Dept: FAMILY MEDICINE | Facility: CLINIC | Age: 44
End: 2024-01-11
Payer: COMMERCIAL

## 2024-01-11 NOTE — TELEPHONE ENCOUNTER
Patient Quality Outreach    Patient is due for the following:   Mammogram and colonoscopy    Next Steps:   - Schedule mammogram and colon screen  - Schedule annual physical with fasting labs  - Vaccine update  - AAP, PHQ2    Type of outreach:    Sent wooju message.      Questions for provider review:    None           Ivone Hale, [facility]

## 2024-01-11 NOTE — LETTER
My Asthma Action Plan    Name: Alma Quinn   YOB: 1980  Date: 1/11/2024   My doctor: Tomeka Leger MD   My clinic: [facility]        My Control Medicine: Fluticasone propionate + salmeterol (Advair HFA) -  230/21 mcg Inhale 2 puffs 2 times per day  My Rescue Medicine: Albuterol (Proair/Ventolin/Proventil HFA) 2-4 puffs EVERY 4 HOURS as needed. Use a spacer if recommended by your provider.   My Asthma Severity:   Intermittent / Exercise Induced  Know your asthma triggers:   pollens            GREEN ZONE   Good Control  I feel good  No cough or wheeze  Can work, sleep and play without asthma symptoms       Take your asthma control medicine every day.     If exercise triggers your asthma, take your rescue medication  15 minutes before exercise or sports, and  During exercise if you have asthma symptoms  Spacer to use with inhaler: If you have a spacer, make sure to use it with your inhaler             YELLOW ZONE Getting Worse  I have ANY of these:  I do not feel good  Cough or wheeze  Chest feels tight  Wake up at night   Keep taking your Green Zone medications  Start taking your rescue medicine:  every 20 minutes for up to 1 hour. Then every 4 hours for 24-48 hours.  If you stay in the Yellow Zone for more than 12-24 hours, contact your doctor.  If you do not return to the Green Zone in 12-24 hours or you get worse, start taking your oral steroid medicine if prescribed by your provider.           RED ZONE Medical Alert - Get Help  I have ANY of these:  I feel awful  Medicine is not helping  Breathing getting harder  Trouble walking or talking  Nose opens wide to breathe       Take your rescue medicine NOW  If your provider has prescribed an oral steroid medicine, start taking it NOW  Call your doctor NOW  If you are still in the Red Zone after 20 minutes and you have not reached your doctor:  Take your rescue medicine again and  Call 911 or go to the emergency room  right away    See your regular doctor within 2 weeks of an Emergency Room or Urgent Care visit for follow-up treatment.          Annual Reminders:  Meet with Asthma Educator,  Flu Shot in the Fall, consider Pneumonia Vaccination for patients with asthma (aged 19 and older).    Pharmacy:    [facility] - [place], [place] - [address] [facility] - [place], [place] - [address] [id number] - [place], [place] - [address] AT [place] [address] [address]    Electronically signed by Tomeka Leger MD   Date: 01/11/24                      Asthma Triggers  How To Control Things That Make Your Asthma Worse    Triggers are things that make your asthma worse.  Look at the list below to help you find your triggers and what you can do about them.  You can help prevent asthma flare-ups by staying away from your triggers.      Trigger                                                          What you can do   Cigarette Smoke  Tobacco smoke can make asthma worse. Do not allow smoking in your home, car or around you.  Be sure no one smokes at a child s day care or school.  If you smoke, ask your health care provider for ways to help you quit.  Ask family members to quit too.  Ask your health care provider for a referral to Quit Plan to help you quit smoking, or call 9-828-177-PLAN.     Colds, Flu, Bronchitis  These are common triggers of asthma. Wash your hands often.  Don t touch your eyes, nose or mouth.  Get a flu shot every year.     Dust Mites  These are tiny bugs that live in cloth or carpet. They are too small to see. Wash sheets and blankets in hot water every week.   Encase pillows and mattress in dust mite proof covers.  Avoid having carpet if you can. If you have carpet, vacuum weekly.   Use a dust mask and HEPA vacuum.   Pollen and Outdoor Mold  Some people are allergic to trees, grass, or weed pollen, or molds. Try to  keep your windows closed.  Limit time out doors when pollen count is high.   Ask you health care provider about taking medicine during allergy season.     Animal Dander  Some people are allergic to skin flakes, urine or saliva from pets with fur or feathers. Keep pets with fur or feathers out of your home.    If you can t keep the pet outdoors, then keep the pet out of your bedroom.  Keep the bedroom door closed.  Keep pets off cloth furniture and away from stuffed toys.     Mice, Rats, and Cockroaches   Some people are allergic to the waste from these pests.   Cover food and garbage.  Clean up spills and food crumbs.  Store grease in the refrigerator.   Keep food out of the bedroom.   Indoor Mold  This can be a trigger if your home has high moisture. Fix leaking faucets, pipes, or other sources of water.   Clean moldy surfaces.  Dehumidify basement if it is damp and smelly.   Smoke, Strong Odors, and Sprays  These can reduce air quality. Stay away from strong odors and sprays, such as perfume, powder, hair spray, paints, smoke incense, paint, cleaning products, candles and new carpet.   Exercise or Sports  Some people with asthma have this trigger. Be active!  Ask your doctor about taking medicine before sports or exercise to prevent symptoms.    Warm up for 5-10 minutes before and after sports or exercise.     Other Triggers of Asthma  Cold air:  Cover your nose and mouth with a scarf.  Sometimes laughing or crying can be a trigger.  Some medicines and food can trigger asthma.

## 2024-02-18 ENCOUNTER — TELEPHONE (OUTPATIENT)
Dept: FAMILY MEDICINE | Facility: CLINIC | Age: 44
End: 2024-02-18
Payer: COMMERCIAL

## 2024-02-18 DIAGNOSIS — J45.41 MODERATE PERSISTENT ASTHMA WITH EXACERBATION: Primary | ICD-10-CM

## 2024-02-18 DIAGNOSIS — J45.40 MODERATE PERSISTENT ASTHMA, UNSPECIFIED WHETHER COMPLICATED: ICD-10-CM

## 2024-02-18 NOTE — TELEPHONE ENCOUNTER
Prior Authorization Retail Medication Request    Medication/Dose: Fluticasone-salmeterol 230-21mcg  Diagnosis and ICD code (if different than what is on RX):    New/renewal/insurance change PA/secondary ins. PA:  Previously Tried and Failed:  proair; proventil; ventolin; albuterol; flovent; pulmicort; singulair  Rationale:      Insurance   Primary: not provided  Insurance ID:  not provided  CMM Key; [id number]    Secondary (if applicable):  Insurance ID:      Pharmacy Information (if different than what is on RX)  Name:    Phone:    Fax:

## 2024-03-01 NOTE — TELEPHONE ENCOUNTER
Central Prior Authorization Team   Phone: [phone]    PA Initiation    Medication: Fluticasone-salmeterol 230-210mcg  Insurance Company: [employer] - Phone [phone] Fax [phone]  Pharmacy Filling the Rx: [facility] - [place] - [address] [facility]  Filling Pharmacy Phone: [phone]  Filling Pharmacy Fax:    Start Date: 3/1/2024

## 2024-03-04 NOTE — TELEPHONE ENCOUNTER
PRIOR AUTHORIZATION DENIED    Medication: Fluticasone-salmeterol 230-210mcg    Denial Date: 3/4/2024    Denial Rational: insurance prefers generic advair diskus, wixela or Breo Ellipta.  Patient would need to try and fail all three alternatives before Advair HFA will be covered     Your plan only covers this drug when you meet one of these options: A) You have tried  other drugs your plan covers (preferred drugs), and they did not work well for you, or B)  Your doctor gives us a medical reason you cannot take those other drugs. For your  plan, you may need to try up to three preferred drugs. We have denied your request  because you do not meet any of these conditions. We reviewed the information we had.  Your request has been denied. Your doctor can send us any new or missing information  for us to review. The preferred drugs for your plan are: fluticasone-salmeterol (except  certain NDCs), Wixela Inhub, BREO ELLIPTA (except certain NDCs). (Requirement: 3  in a class with 3 or more alternatives, 2 in a class with 2 alternatives, or 1 in a class with  only 1 alternative.). Your doctor may need to get approval from your plan for preferred  drugs. For this drug, you may have to meet other criteria. You can request the drug  policy for more details. You can also request other plan documents for your review          Appeal Information: Review the plan's reasons for denial listed above. Please utilize that information to complete letter and provide specific, detailed clinical information/rationale of your patient's health status to address their denial reasons.

## 2024-03-06 RX ORDER — FLUTICASONE PROPIONATE AND SALMETEROL 250; 50 UG/1; UG/1
1 POWDER RESPIRATORY (INHALATION) EVERY 12 HOURS
Qty: 60 EACH | Refills: 5 | Status: SHIPPED | OUTPATIENT
Start: 2024-03-06 | End: 2024-04-05 | Stop reason: DRUGHIGH

## 2024-03-06 NOTE — TELEPHONE ENCOUNTER
Called and notified patient and she will pick this up.    Michaelle Alonso RN on 3/6/2024 at 9:45 AM

## 2024-03-06 NOTE — TELEPHONE ENCOUNTER
Notify patient:  Advair HFA was denied by insurance    I did a prescription for Advair Diskus (or generic) as preferred by her insurance. This is the same medication, just a different delivery system.     Prescription sent to [facility].    Tomeka Leger M.D.

## 2024-03-06 NOTE — TELEPHONE ENCOUNTER
Dr. Leger  PA for fluticasone-salmeterol has been denied.    Denial Rational: insurance prefers generic advair diskus, wixela or Breo Ellipta.  Patient would need to try and fail all three alternatives before Advair HFA will be covered     Carmen Monahan

## 2024-04-05 ENCOUNTER — OFFICE VISIT (OUTPATIENT)
Dept: FAMILY MEDICINE | Facility: CLINIC | Age: 44
End: 2024-04-05
Payer: COMMERCIAL

## 2024-04-05 ENCOUNTER — ANCILLARY PROCEDURE (OUTPATIENT)
Dept: GENERAL RADIOLOGY | Facility: CLINIC | Age: 44
End: 2024-04-05
Attending: NURSE PRACTITIONER
Payer: COMMERCIAL

## 2024-04-05 VITALS
SYSTOLIC BLOOD PRESSURE: 116 MMHG | BODY MASS INDEX: 43.16 KG/M2 | HEART RATE: 88 BPM | DIASTOLIC BLOOD PRESSURE: 76 MMHG | OXYGEN SATURATION: 98 % | HEIGHT: 64 IN | TEMPERATURE: 97.7 F | WEIGHT: 252.8 LBS | RESPIRATION RATE: 16 BRPM

## 2024-04-05 DIAGNOSIS — Z13.6 CARDIOVASCULAR SCREENING; LDL GOAL LESS THAN 130: ICD-10-CM

## 2024-04-05 DIAGNOSIS — Z12.31 VISIT FOR SCREENING MAMMOGRAM: ICD-10-CM

## 2024-04-05 DIAGNOSIS — Z12.11 SCREEN FOR COLON CANCER: ICD-10-CM

## 2024-04-05 DIAGNOSIS — E66.01 MORBID OBESITY (H): ICD-10-CM

## 2024-04-05 DIAGNOSIS — M54.2 CERVICALGIA: ICD-10-CM

## 2024-04-05 DIAGNOSIS — M43.12 ANTEROLISTHESIS OF CERVICAL SPINE: ICD-10-CM

## 2024-04-05 DIAGNOSIS — J45.40 MODERATE PERSISTENT ASTHMA WITHOUT COMPLICATION: ICD-10-CM

## 2024-04-05 DIAGNOSIS — R73.03 PREDIABETES: ICD-10-CM

## 2024-04-05 DIAGNOSIS — Z00.00 ROUTINE PHYSICAL EXAMINATION: Primary | ICD-10-CM

## 2024-04-05 LAB
ALBUMIN SERPL BCG-MCNC: 4.4 G/DL (ref 3.5–5.2)
ALP SERPL-CCNC: 93 U/L (ref 40–150)
ALT SERPL W P-5'-P-CCNC: 28 U/L (ref 0–50)
ANION GAP SERPL CALCULATED.3IONS-SCNC: 12 MMOL/L (ref 7–15)
AST SERPL W P-5'-P-CCNC: 28 U/L (ref 0–45)
BILIRUB SERPL-MCNC: 0.3 MG/DL
BUN SERPL-MCNC: 15.1 MG/DL (ref 6–20)
CALCIUM SERPL-MCNC: 9.3 MG/DL (ref 8.6–10)
CHLORIDE SERPL-SCNC: 103 MMOL/L (ref 98–107)
CHOLEST SERPL-MCNC: 183 MG/DL
CREAT SERPL-MCNC: 0.84 MG/DL (ref 0.51–0.95)
DEPRECATED HCO3 PLAS-SCNC: 25 MMOL/L (ref 22–29)
EGFRCR SERPLBLD CKD-EPI 2021: 87 ML/MIN/1.73M2
FASTING STATUS PATIENT QL REPORTED: YES
GLUCOSE SERPL-MCNC: 106 MG/DL (ref 70–99)
HBA1C MFR BLD: 5.5 % (ref 0–5.6)
HDLC SERPL-MCNC: 60 MG/DL
LDLC SERPL CALC-MCNC: 95 MG/DL
NONHDLC SERPL-MCNC: 123 MG/DL
POTASSIUM SERPL-SCNC: 4.6 MMOL/L (ref 3.4–5.3)
PROT SERPL-MCNC: 6.8 G/DL (ref 6.4–8.3)
SODIUM SERPL-SCNC: 140 MMOL/L (ref 135–145)
TRIGL SERPL-MCNC: 139 MG/DL
TSH SERPL DL<=0.005 MIU/L-ACNC: 1.3 UIU/ML (ref 0.3–4.2)

## 2024-04-05 PROCEDURE — 83036 HEMOGLOBIN GLYCOSYLATED A1C: CPT | Performed by: NURSE PRACTITIONER

## 2024-04-05 PROCEDURE — 80061 LIPID PANEL: CPT | Performed by: NURSE PRACTITIONER

## 2024-04-05 PROCEDURE — 36415 COLL VENOUS BLD VENIPUNCTURE: CPT | Performed by: NURSE PRACTITIONER

## 2024-04-05 PROCEDURE — 80053 COMPREHEN METABOLIC PANEL: CPT | Performed by: NURSE PRACTITIONER

## 2024-04-05 PROCEDURE — 84443 ASSAY THYROID STIM HORMONE: CPT | Performed by: NURSE PRACTITIONER

## 2024-04-05 PROCEDURE — 99213 OFFICE O/P EST LOW 20 MIN: CPT | Mod: 25 | Performed by: NURSE PRACTITIONER

## 2024-04-05 PROCEDURE — 72040 X-RAY EXAM NECK SPINE 2-3 VW: CPT | Mod: TC | Performed by: RADIOLOGY

## 2024-04-05 PROCEDURE — 99396 PREV VISIT EST AGE 40-64: CPT | Performed by: NURSE PRACTITIONER

## 2024-04-05 RX ORDER — FLUTICASONE PROPIONATE AND SALMETEROL 500; 50 UG/1; UG/1
1 POWDER RESPIRATORY (INHALATION) EVERY 12 HOURS
Qty: 60 EACH | Refills: 11 | Status: SHIPPED | OUTPATIENT
Start: 2024-04-05

## 2024-04-05 RX ORDER — PREDNISONE 20 MG/1
20 TABLET ORAL 2 TIMES DAILY
Qty: 10 TABLET | Refills: 0 | Status: SHIPPED | OUTPATIENT
Start: 2024-04-05 | End: 2024-04-10

## 2024-04-05 SDOH — HEALTH STABILITY: PHYSICAL HEALTH: ON AVERAGE, HOW MANY DAYS PER WEEK DO YOU ENGAGE IN MODERATE TO STRENUOUS EXERCISE (LIKE A BRISK WALK)?: 3 DAYS

## 2024-04-05 ASSESSMENT — ASTHMA QUESTIONNAIRES
ACT_TOTALSCORE: 15
QUESTION_3 LAST FOUR WEEKS HOW OFTEN DID YOUR ASTHMA SYMPTOMS (WHEEZING, COUGHING, SHORTNESS OF BREATH, CHEST TIGHTNESS OR PAIN) WAKE YOU UP AT NIGHT OR EARLIER THAN USUAL IN THE MORNING: ONCE OR TWICE
QUESTION_5 LAST FOUR WEEKS HOW WOULD YOU RATE YOUR ASTHMA CONTROL: SOMEWHAT CONTROLLED
QUESTION_2 LAST FOUR WEEKS HOW OFTEN HAVE YOU HAD SHORTNESS OF BREATH: ONCE A DAY
QUESTION_4 LAST FOUR WEEKS HOW OFTEN HAVE YOU USED YOUR RESCUE INHALER OR NEBULIZER MEDICATION (SUCH AS ALBUTEROL): TWO OR THREE TIMES PER WEEK
QUESTION_1 LAST FOUR WEEKS HOW MUCH OF THE TIME DID YOUR ASTHMA KEEP YOU FROM GETTING AS MUCH DONE AT WORK, SCHOOL OR AT HOME: SOME OF THE TIME
ACT_TOTALSCORE: 15

## 2024-04-05 ASSESSMENT — SOCIAL DETERMINANTS OF HEALTH (SDOH): HOW OFTEN DO YOU GET TOGETHER WITH FRIENDS OR RELATIVES?: ONCE A WEEK

## 2024-04-05 NOTE — RESULT ENCOUNTER NOTE
Jackie Lawrence-    Your xray shows one vertebrae is slipped forward over the other- graded on a 1-4 scale, grade 1 is mild. Recommend to trial physical therapy for 6 weeks before seeing spine specialist. I put a referral in and you will get a call to schedule. Please let us know if you have any questions.     Take care,    DELANO Leal CNP

## 2024-04-05 NOTE — PROGRESS NOTES
"Preventive Care Visit  [facility]  DELANO Lin CNP, Family Medicine  Apr 5, 2024      Assessment & Plan     Routine physical examination      Moderate persistent asthma without complication  Uncontrolled, increase dose of Advair;  - fluticasone-salmeterol (ADVAIR) 500-50 MCG/ACT inhaler; Inhale 1 puff into the lungs every 12 hours  - predniSONE (DELTASONE) 20 MG tablet; Take 1 tablet (20 mg) by mouth 2 times daily for 5 days    Prediabetes    - Hemoglobin A1c; Future  - Comprehensive metabolic panel (BMP + Alb, Alk Phos, ALT, AST, Total. Bili, TP); Future  - Hemoglobin A1c  - Comprehensive metabolic panel (BMP + Alb, Alk Phos, ALT, AST, Total. Bili, TP)    Cervicalgia    - XR Cervical Spine 2/3 Views; Future  - Spine  Referral; Future    Morbid obesity (H)  Blood sugar would benefit from weight loss  - TSH with free T4 reflex; Future  - TSH with free T4 reflex    Screen for colon cancer    - Colonoscopy Screening  Referral; Future    Visit for screening mammogram    - MA SCREENING DIGITAL BILAT - Future  (s+30); Future    CARDIOVASCULAR SCREENING; LDL GOAL LESS THAN 130    - Lipid panel reflex to direct LDL Non-fasting; Future  - Lipid panel reflex to direct LDL Non-fasting        BMI  Estimated body mass index is 43.06 kg/m  as calculated from the following:    Height as of this encounter: 1.632 m (5' 4.25\").    Weight as of this encounter: 114.7 kg (252 lb 12.8 oz).   Weight management plan: Discussed healthy diet and exercise guidelines    Counseling  Appropriate preventive services were discussed with this patient, including applicable screening as appropriate for fall prevention, nutrition, physical activity, Tobacco-use cessation, weight loss and cognition.  Checklist reviewing preventive services available has been given to the patient.  Reviewed patient's diet, addressing concerns and/or questions.   She is at risk for lack of exercise and has been " provided with information to increase physical activity for the benefit of her well-being.   Discussed possible causes of fatigue. Updated plan of care.  Patient reported difficulty with activities of daily living were addressed today.Patient reported safety concerns were addressed today.Information on urinary incontinence and treatment options given to patient.       CONSULTATION/REFERRAL to physical therapy, spine if not improving.     FUTURE APPOINTMENTS:       - Follow-up visit in 3-4 weeks for asthma check    See Patient Instructions    Sulaiman Thompson is a 43 year old, presenting for the following:  Physical        4/5/2024     7:57 AM   Additional Questions   Roomed by Daisy AMEZQUITA        Health Care Directive  Patient does not have a Health Care Directive or Living Will: Discussed advance care planning with patient; however, patient declined at this time.    HPI    Asthma Follow-Up    Was ACT completed today?  Yes        4/5/2024     7:57 AM   ACT Total Scores   ACT TOTAL SCORE (Goal Greater than or Equal to 20) 15   In the past 12 months, how many times did you visit the emergency room for your asthma without being admitted to the hospital? 0   In the past 12 months, how many times were you hospitalized overnight because of your asthma? 0       How many days per week do you miss taking your asthma controller medication?  0  Please describe any recent triggers for your asthma: pollens, mold, and cold air  Have you had any Emergency Room Visits, Urgent Care Visits, or Hospital Admissions since your last office visit?  No    Joint Pain  Onset: years  Description:   Location: neck  Character: Sharp  Intensity: severe  Progression of Symptoms: intermittent  Accompanying Signs & Symptoms:  Other symptoms: intermittent severe pain when turning neck that causes vision black out and weakness in legs she attributes to the severity of the pain  History:   Previous similar pain: YES- low back, joints    Precipitating  factors:   Trauma or overuse: no   Alleviating factors:  Improved by: nothing    Therapies Tried and outcome: physical therapy         2024   General Health   How would you rate your overall physical health? (!) FAIR   Feel stress (tense, anxious, or unable to sleep) Very much   (!) STRESS CONCERN      2024   Nutrition   Diet: I don't know         2024   Exercise   Days per week of moderate/strenous exercise 3 days         2024   Social Factors   Frequency of gathering with friends or relatives Once a week   Worry food won't last until get money to buy more No   Food not last or not have enough money for food? No   Do you have housing?  Yes   Are you worried about losing your housing? No   Lack of transportation? No   Unable to get utilities (heat,electricity)? No         2024   Dental   Dentist two times every year? Yes            Today's PHQ-2 Score:       2024     7:55 AM   PHQ-2 (  [employer])   Q1: Little interest or pleasure in doing things 0   Q2: Feeling down, depressed or hopeless 0   PHQ-2 Score 0   Q1: Little interest or pleasure in doing things Not at all   Q2: Feeling down, depressed or hopeless Not at all   PHQ-2 Score 0           2024   Substance Use   Alcohol more than 3/day or more than 7/wk No   Do you use any other substances recreationally? No     Social History     Tobacco Use    Smoking status: Former     Packs/day: 0.50     Years: 15.00     Additional pack years: 0.00     Total pack years: 7.50     Types: Cigarettes     Quit date: 2014     Years since quittin.7    Smokeless tobacco: Never    Tobacco comments:     1/4 pack daily   Vaping Use    Vaping Use: Never used   Substance Use Topics    Alcohol use: No    Drug use: No           2022   LAST FHS-7 RESULTS   1st degree relative breast or ovarian cancer No   Any relative bilateral breast cancer Yes   Any male have breast cancer No   Any ONE woman have BOTH breast AND ovarian cancer Yes   Any woman with  "breast cancer before 50yrs No   2 or more relatives with breast AND/OR ovarian cancer No   2 or more relatives with breast AND/OR bowel cancer Yes        Mammogram Screening - Mammogram every 1-2 years updated in Health Maintenance based on mutual decision making      History of abnormal Pap smear: NO - age 30-65 PAP every 5 years with negative HPV co-testing recommended        Latest Ref Rng & Units 7/6/2022     2:52 PM 6/9/2016    10:30 AM 6/9/2016    12:00 AM   PAP / HPV   PAP  Negative for Intraepithelial Lesion or Malignancy (NILM)      PAP (Historical)    NIL    HPV 16 DNA Negative Negative  Negative     HPV 18 DNA Negative Negative  Negative     Other HR HPV Negative Negative  Negative       ASCVD Risk   The ASCVD Risk score (Arnoldo PRADO, et al., 2019) failed to calculate for the following reasons:    Cannot find a previous HDL lab    Cannot find a previous total cholesterol lab        4/5/2024   Contraception/Family Planning   Questions about contraception or family planning No        Reviewed and updated as needed this visit by Provider                    Labs reviewed in EPIC  BP Readings from Last 3 Encounters:   04/05/24 116/76   07/26/23 120/80   07/09/23 126/64    Wt Readings from Last 3 Encounters:   04/05/24 114.7 kg (252 lb 12.8 oz)   07/26/23 123.8 kg (273 lb)   07/09/22 139.3 kg (307 lb)                      Review of Systems  Constitutional, HEENT, cardiovascular, pulmonary, gi and gu systems are negative, except as otherwise noted.     Objective    Exam  /76   Pulse 88   Temp 97.7  F (36.5  C) (Tympanic)   Resp 16   Ht 1.632 m (5' 4.25\")   Wt 114.7 kg (252 lb 12.8 oz)   SpO2 98%   BMI 43.06 kg/m     Estimated body mass index is 43.06 kg/m  as calculated from the following:    Height as of this encounter: 1.632 m (5' 4.25\").    Weight as of this encounter: 114.7 kg (252 lb 12.8 oz).    Physical Exam  GENERAL: alert and no distress  EYES: Eyes grossly normal to inspection and " conjunctivae and sclerae normal  HENT: normal cephalic/atraumatic, oropharynx clear, and oral mucous membranes moist  NECK: no adenopathy, no asymmetry, masses, or scars  RESP: no rales, rhonchi and bilateral expiratory wheezes  CV: regular rate and rhythm, normal S1 S2, no S3 or S4, no murmur, click or rub, no peripheral edema  ABDOMEN: soft, nontender and no palpable or pulsatile masses  MS: no gross musculoskeletal defects noted, no edema  SKIN: no suspicious lesions or rashes  NEURO: Normal strength and tone, mentation intact and speech normal  PSYCH: mentation appears normal, affect normal/bright        Signed Electronically by: DELANO Lin CNP

## 2024-04-07 NOTE — RESULT ENCOUNTER NOTE
Jackie Marquez    Your lab results came back within normal limits. Your pre-diabetes has resolved and is now in the normal range. Please let us know if you have any questions.     Take care,    DELANO Leal CNP well  routine  care

## 2024-04-11 ENCOUNTER — OFFICE VISIT (OUTPATIENT)
Dept: FAMILY MEDICINE | Facility: CLINIC | Age: 44
End: 2024-04-11
Payer: COMMERCIAL

## 2024-04-11 ENCOUNTER — ANCILLARY PROCEDURE (OUTPATIENT)
Dept: GENERAL RADIOLOGY | Facility: CLINIC | Age: 44
End: 2024-04-11
Attending: FAMILY MEDICINE
Payer: COMMERCIAL

## 2024-04-11 VITALS
HEIGHT: 65 IN | OXYGEN SATURATION: 96 % | SYSTOLIC BLOOD PRESSURE: 120 MMHG | TEMPERATURE: 99.2 F | BODY MASS INDEX: 41.45 KG/M2 | DIASTOLIC BLOOD PRESSURE: 78 MMHG | RESPIRATION RATE: 20 BRPM | HEART RATE: 78 BPM | WEIGHT: 248.8 LBS

## 2024-04-11 DIAGNOSIS — Z12.11 SCREEN FOR COLON CANCER: ICD-10-CM

## 2024-04-11 DIAGNOSIS — M79.671 ACUTE FOOT PAIN, RIGHT: Primary | ICD-10-CM

## 2024-04-11 DIAGNOSIS — R22.42 LOCALIZED SWELLING OF TOE OF LEFT FOOT: ICD-10-CM

## 2024-04-11 LAB — URATE SERPL-MCNC: 4.4 MG/DL (ref 2.4–5.7)

## 2024-04-11 PROCEDURE — 99213 OFFICE O/P EST LOW 20 MIN: CPT | Mod: 25 | Performed by: FAMILY MEDICINE

## 2024-04-11 PROCEDURE — 91320 SARSCV2 VAC 30MCG TRS-SUC IM: CPT | Performed by: FAMILY MEDICINE

## 2024-04-11 PROCEDURE — 90677 PCV20 VACCINE IM: CPT | Performed by: FAMILY MEDICINE

## 2024-04-11 PROCEDURE — 73660 X-RAY EXAM OF TOE(S): CPT | Mod: TC | Performed by: RADIOLOGY

## 2024-04-11 PROCEDURE — 90471 IMMUNIZATION ADMIN: CPT | Performed by: FAMILY MEDICINE

## 2024-04-11 PROCEDURE — 84550 ASSAY OF BLOOD/URIC ACID: CPT | Performed by: FAMILY MEDICINE

## 2024-04-11 PROCEDURE — 90480 ADMN SARSCOV2 VAC 1/ONLY CMP: CPT | Performed by: FAMILY MEDICINE

## 2024-04-11 PROCEDURE — 36415 COLL VENOUS BLD VENIPUNCTURE: CPT | Performed by: FAMILY MEDICINE

## 2024-04-11 RX ORDER — COLCHICINE 0.6 MG/1
TABLET ORAL
Qty: 20 TABLET | Refills: 0 | Status: SHIPPED | OUTPATIENT
Start: 2024-04-11

## 2024-04-11 ASSESSMENT — PAIN SCALES - GENERAL: PAINLEVEL: SEVERE PAIN (7)

## 2024-04-11 NOTE — PROGRESS NOTES
Assessment & Plan     Acute foot pain, right  No direct or known trauma. But patient said she shifted her weight to the right foot and felt a pop.  Hence, xR obtained to rule out stress or occult fracture.  Patient was advised of toe imaging. Final radiology report pending.  Discussed suspect first gout attack. Measure uric acid today to help support diagnosis.  Unable to use oral NSAIDs due to bariatric surgery.  Colchicine use discussed in detail.  Advised expected response.  Advised gout prevention.  Push oral fluids.  Return precautions discussed and given to patient.   - XR Toe Right G/E 2 Views  - Uric acid  - colchicine (COLCRYS) 0.6 MG tablet  Dispense: 20 tablet; Refill: 0    Localized swelling of toe of left foot  See above for management  - XR Toe Right G/E 2 Views  - Uric acid  - colchicine (COLCRYS) 0.6 MG tablet  Dispense: 20 tablet; Refill: 0      Patient Instructions   Take colchicine as prescibed for 3-5 days until the pain has resolved.  Take plenty of water.    Read more information about your conditions in the handouts attached to this visit summary.     Sulaiman Thompson is a 43 year old, presenting for the following health issues:  Foot Injury (Right, X1 day, NKI, pt states she has drop toe syndrome in left foot and believes she may have caused strain due to repositioning )        4/11/2024     3:22 PM   Additional Questions   Roomed by Anamika CORREA MA   Accompanied by none         4/11/2024     3:22 PM   Patient Reported Additional Medications   Patient reports taking the following new medications none     Foot Injury    History of Present Illness       Reason for visit:  Foot pain    She eats 2-3 servings of fruits and vegetables daily.She consumes 1 sweetened beverage(s) daily.She exercises with enough effort to increase her heart rate 20 to 29 minutes per day.  She exercises with enough effort to increase her heart rate 3 or less days per week. She is missing 7 dose(s) of medications per  "week.     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      Review of Systems  Constitutional, neuro, ENT, endocrine, pulmonary, cardiac, gastrointestinal, genitourinary, musculoskeletal, integument and psychiatric systems are negative, except as otherwise noted.      Objective    /78 (BP Location: Right arm, Patient Position: Sitting, Cuff Size: Adult Regular)   Pulse 78   Temp 99.2  F (37.3  C) (Tympanic)   Resp 20   Ht 1.651 m (5' 5\")   Wt 112.9 kg (248 lb 12.8 oz)   SpO2 96%   BMI 41.40 kg/m    Body mass index is 41.4 kg/m .  Physical Exam   GENERAL:  alert and no distress  Right Foot Exam: Inspection:  mild swelling of the 5th MTP with erythema extending to the 5th toe.  Tender:: severe TTP of the 5th PTP extending with moderate TTP on 5th MTP shaft and 5th toe  Non-tender:rest of foot  Range of Motion:painful range of motion of 5th toe only  SKIN: no suspicious lesions, no rashes     Results for orders placed or performed in visit on 04/11/24   XR Toe Right G/E 2 Views     Status: None    Narrative    XR TOE RIGHT G/E 2 VIEWS   4/11/2024 4:09 PM     HISTORY:  r/o fracture; Acute foot pain, right; Localized swelling of  toe of left foot      Impression    IMPRESSION:  Negative exam.    SHERMAN SALEH MD         SYSTEM ID:  [id number]           Signed Electronically by: Jac Martinez MD    "

## 2024-04-11 NOTE — PATIENT INSTRUCTIONS
Take colchicine as prescibed for 3-5 days until the pain has resolved.  Take plenty of water.    Read more information about your conditions in the handouts attached to this visit summary.

## 2024-05-07 ENCOUNTER — TELEPHONE (OUTPATIENT)
Dept: FAMILY MEDICINE | Facility: CLINIC | Age: 44
End: 2024-05-07
Payer: COMMERCIAL

## 2024-05-07 NOTE — TELEPHONE ENCOUNTER
Patient Quality Outreach    Patient is due for the following:   Colon Cancer Screening  Breast Cancer Screening - Mammogram    Next Steps:   Schedule mammo and colonoscopy    Type of outreach:    Sent letter.    Next Steps:  Reach out within 90 days via Letter.    Max number of attempts reached: No. Will try again in 90 days if patient still on fail list.    Questions for provider review:    None           Michaela Padilla, [facility]

## 2024-05-07 NOTE — LETTER
May 7, 2024    To  Alma Quinn  [address] [facility] [address]    Your team at [facility] cares about your health. We have reviewed your chart and based on our findings; we are making the following recommendations to better manage your health.     You are in particular need of attention regarding the following:     Colon cancer is now the second leading cause of cancer-related deaths in the [place] for both men and women and there are over 130,000 new cases and 50,000 deaths per year from colon cancer. Colonoscopies can prevent 90-95% of these deaths. Problem lesions can be removed before they ever become cancer. This test is not only looking for cancer, but also getting rid of precancerous lesions.   Please call [phone] to schedule a colonoscopy.  Schedule Annual [facility] scheduling number is [phone] or schedule in [facility] (self referral).  1 in 8 women will develop invasive breast cancer during her lifetime and it is the most common non-skin cancer in [place] Women. EARLY detection, new treatments, and a better understanding of the disease have increased survival rates- the 5 year survival rate in the 1960's was 63% and today it is close to 90%.    If you have already completed these items, please contact the clinic via phone or   [facility] so your care team can review and update your records. Thank you for   choosing [facility] for your healthcare needs. For any questions,   concerns, or to schedule an appointment please contact our clinic.    Healthy Regards,      Your [facility] Care Team

## 2024-06-06 NOTE — PROGRESS NOTES
"Donna is a 40 year old who is being evaluated via a billable video visit.      How would you like to obtain your AVS? MyChart  If the video visit is dropped, the invitation should be resent by: Send to e-mail at: [email or web address] [phone]  Will anyone else be joining your video visit? No     Video Start Time: 0821  Assessment & Plan     Gastroesophageal reflux disease, unspecified whether esophagitis present   uncontrolled, worsened  Increase to 40 mg twice daily   Follow up with her bariatric surgeon who has managed this in the past and has done her EGDs, pH study, etc  - omeprazole (PRILOSEC) 40 MG DR capsule; Take 1 capsule (40 mg) by mouth daily    Pain of both elbows  See orthopedics.  Based on what she is telling me and limited exam, suspect that this is more tendonitis/epicondylitis, but unclear. With her history of inflammatory arthritis, it's also possible that is playing a role.  Start with ortho but consider rheum.   - Orthopedic & Spine  Referral; Future    Status post bariatric surgery     - WEIGHT/BARIATRIC PEDS REFERRAL     Hiatal hernia   follow up with bariatric surgeon.  Referred back to her surgeon who is at [facility].   - WEIGHT/BARIATRIC PEDS REFERRAL     Review of prior external note(s) from - CareEverywhere information from [facility] reviewed  Review of the result(s) of each unique test - 6+ rheumatologic labs - all from 1/2018 from [facility]          23 minutes spent on the date of the encounter doing chart review, history and exam, documentation and further activities as noted above         BMI:   Estimated body mass index is 49.92 kg/m  as calculated from the following:    Height as of 3/15/20: 1.651 m (5' 5\").    Weight as of 3/15/20: 136.1 kg (300 lb).             No follow-ups on file.    Tomeka Leger MD  [facility]     Donna is a 40 year old who presents to clinic today for the following health issues     HPI       Chief Complaint "   Patient presents with     Joint Pain     Patient is having trouble with left elbow pain. She is losing  and when she took prednisone she felt great. Patient rates pain at 5/10 and is taking ibu and tylenol for discomfort with mild relief.      Heartburn     Patient is having heart pain at night and that causes chest pain and it is due to her hernia and is following up with her bariatric  MD.      Elbows get warm, erythematous along the lateral elbow.    Prednisone helped this. But her restless legs stopped at night as well on the prednisone.    She describes weakness when she is lifting anything or her arm is outstretched while she is holding anything even mildly heavy.     Patient previously has seen rheumatology multiple times - most recent labs 1/2018 - reviewed in Epic. She is seronegative but has been treated with plaquenil (caused a rash) and methotrexate in the past (that was helpful).     She has a bump on the back of her left hand as well - likely a ganglion but that is not causing pain.    Patient also knows she has a hiatal hernia.  She was told previously by her bariatric surgeon that it would get worse over time.  She is on omeprazole 20 mg twice daily right now but continues to have waterbrash symptoms, has to sleep elevated, is taking tums multiple times a day (we did discuss that this could worsen acid production).         Review of Systems   Constitutional, HEENT, cardiovascular, pulmonary, gi and gu systems are negative, except as otherwise noted.      Objective           Vitals:  No vitals were obtained today due to virtual visit.    Physical Exam   GENERAL: Healthy, alert and no distress  EYES: Eyes grossly normal to inspection.  No discharge or erythema, or obvious scleral/conjunctival abnormalities.  RESP: No audible wheeze, cough, or visible cyanosis.  No visible retractions or increased work of breathing.    MS: points to lateral epicondyle bilaterally for elbow pain.  Visible ganglion  on left hand.   Some pain with extension of wrist. Exam significantly limited by video visit.   SKIN: Visible skin clear. No significant rash, abnormal pigmentation or lesions.  NEURO: Cranial nerves grossly intact.  Mentation and speech appropriate for age.  PSYCH: Mentation appears normal, affect normal/bright, judgement and insight intact, normal speech and appearance well-groomed.                Video-Visit Details    Type of service:  Video Visit    Video End Time:8:38 AM    Originating Location (pt. Location): Home    Distant Location (provider location):  [facility]     Platform used for Video Visit: Protection Plus   22.2

## 2024-08-23 DIAGNOSIS — K21.9 GASTROESOPHAGEAL REFLUX DISEASE, UNSPECIFIED WHETHER ESOPHAGITIS PRESENT: ICD-10-CM

## 2024-08-26 ENCOUNTER — TELEPHONE (OUTPATIENT)
Dept: FAMILY MEDICINE | Facility: CLINIC | Age: 44
End: 2024-08-26
Payer: COMMERCIAL

## 2024-08-26 DIAGNOSIS — K21.9 GASTROESOPHAGEAL REFLUX DISEASE, UNSPECIFIED WHETHER ESOPHAGITIS PRESENT: ICD-10-CM

## 2024-08-26 RX ORDER — OMEPRAZOLE 40 MG/1
40 CAPSULE, DELAYED RELEASE ORAL DAILY
Qty: 146 CAPSULE | Refills: 0 | Status: SHIPPED | OUTPATIENT
Start: 2024-08-26 | End: 2024-09-17

## 2024-08-26 RX ORDER — OMEPRAZOLE 40 MG/1
40 CAPSULE, DELAYED RELEASE ORAL DAILY
Qty: 146 CAPSULE | Refills: 0 | Status: CANCELLED | OUTPATIENT
Start: 2024-08-26

## 2024-08-26 NOTE — TELEPHONE ENCOUNTER
Hello,    You wrote for 146 capsules. Was wondering how many you really wanted to write for.       Thank you,    Marisela Shields  Certified Pharmacy Technician II, [facility]  Ph: [phone]  Fx: [phone]

## 2024-09-17 ENCOUNTER — VIRTUAL VISIT (OUTPATIENT)
Dept: FAMILY MEDICINE | Facility: CLINIC | Age: 44
End: 2024-09-17
Payer: COMMERCIAL

## 2024-09-17 DIAGNOSIS — M51.369 DDD (DEGENERATIVE DISC DISEASE), LUMBAR: ICD-10-CM

## 2024-09-17 DIAGNOSIS — K21.9 GASTROESOPHAGEAL REFLUX DISEASE, UNSPECIFIED WHETHER ESOPHAGITIS PRESENT: ICD-10-CM

## 2024-09-17 DIAGNOSIS — J45.41 MODERATE PERSISTENT ASTHMA WITH EXACERBATION: ICD-10-CM

## 2024-09-17 DIAGNOSIS — F31.9 BIPOLAR AFFECTIVE DISORDER, REMISSION STATUS UNSPECIFIED (H): ICD-10-CM

## 2024-09-17 DIAGNOSIS — M43.12 ANTEROLISTHESIS OF CERVICAL SPINE: Primary | ICD-10-CM

## 2024-09-17 DIAGNOSIS — M62.830 BACK MUSCLE SPASM: ICD-10-CM

## 2024-09-17 PROBLEM — K44.9 HIATAL HERNIA: Status: ACTIVE | Noted: 2022-07-07

## 2024-09-17 PROBLEM — Z98.84 S/P GASTRIC BYPASS: Status: ACTIVE | Noted: 2022-07-07

## 2024-09-17 PROBLEM — Z11.1 SCREENING FOR TUBERCULOSIS: Status: ACTIVE | Noted: 2019-02-14

## 2024-09-17 PROCEDURE — 99442 PR PHYSICIAN TELEPHONE EVALUATION 11-20 MIN: CPT | Mod: 93 | Performed by: NURSE PRACTITIONER

## 2024-09-17 RX ORDER — ALBUTEROL SULFATE 0.83 MG/ML
2.5 SOLUTION RESPIRATORY (INHALATION) EVERY 4 HOURS PRN
Qty: 300 ML | Refills: 3 | Status: SHIPPED | OUTPATIENT
Start: 2024-09-17

## 2024-09-17 RX ORDER — PREDNISONE 20 MG/1
TABLET ORAL
Qty: 20 TABLET | Refills: 0 | Status: SHIPPED | OUTPATIENT
Start: 2024-09-17

## 2024-09-17 RX ORDER — METHOCARBAMOL 500 MG/1
500 TABLET, FILM COATED ORAL 4 TIMES DAILY PRN
Qty: 90 TABLET | Refills: 3 | Status: CANCELLED | OUTPATIENT
Start: 2024-09-17

## 2024-09-17 RX ORDER — MONTELUKAST SODIUM 10 MG/1
10 TABLET ORAL AT BEDTIME
Qty: 90 TABLET | Refills: 3 | Status: SHIPPED | OUTPATIENT
Start: 2024-09-17

## 2024-09-17 RX ORDER — ALBUTEROL SULFATE 90 UG/1
AEROSOL, METERED RESPIRATORY (INHALATION)
Qty: 18 G | Refills: 11 | Status: SHIPPED | OUTPATIENT
Start: 2024-09-17

## 2024-09-17 RX ORDER — METHOCARBAMOL 500 MG/1
500 TABLET, FILM COATED ORAL 4 TIMES DAILY PRN
Qty: 90 TABLET | Refills: 3 | Status: SHIPPED | OUTPATIENT
Start: 2024-09-17

## 2024-09-17 RX ORDER — OMEPRAZOLE 40 MG/1
40 CAPSULE, DELAYED RELEASE ORAL DAILY
Qty: 90 CAPSULE | Refills: 3 | Status: SHIPPED | OUTPATIENT
Start: 2024-09-17

## 2024-09-17 RX ORDER — BUPROPION HYDROCHLORIDE 300 MG/1
300 TABLET ORAL EVERY MORNING
Qty: 90 TABLET | Refills: 4 | Status: SHIPPED | OUTPATIENT
Start: 2024-09-17

## 2024-09-17 ASSESSMENT — ASTHMA QUESTIONNAIRES
ACT_TOTALSCORE: 20
QUESTION_1 LAST FOUR WEEKS HOW MUCH OF THE TIME DID YOUR ASTHMA KEEP YOU FROM GETTING AS MUCH DONE AT WORK, SCHOOL OR AT HOME: NONE OF THE TIME
QUESTION_5 LAST FOUR WEEKS HOW WOULD YOU RATE YOUR ASTHMA CONTROL: SOMEWHAT CONTROLLED
QUESTION_3 LAST FOUR WEEKS HOW OFTEN DID YOUR ASTHMA SYMPTOMS (WHEEZING, COUGHING, SHORTNESS OF BREATH, CHEST TIGHTNESS OR PAIN) WAKE YOU UP AT NIGHT OR EARLIER THAN USUAL IN THE MORNING: ONCE OR TWICE
QUESTION_2 LAST FOUR WEEKS HOW OFTEN HAVE YOU HAD SHORTNESS OF BREATH: ONCE OR TWICE A WEEK
QUESTION_4 LAST FOUR WEEKS HOW OFTEN HAVE YOU USED YOUR RESCUE INHALER OR NEBULIZER MEDICATION (SUCH AS ALBUTEROL): ONCE A WEEK OR LESS
ACT_TOTALSCORE: 20

## 2024-09-17 NOTE — PROGRESS NOTES
Donna is a 44 year old who is being evaluated via a billable video visit.    How would you like to obtain your AVS? MyChart  If the video visit is dropped, the invitation should be resent by: Text to cell phone: [phone]  Will anyone else be joining your video visit? No  {If patient encounters technical issues they should call [phone] :645501}    {PROVIDER CHARTING PREFERENCE:774778}    Subjective   Donna is a 44 year old, presenting for the following health issues:  Musculoskeletal Problem      9/17/2024     3:54 PM   Additional Questions   Roomed by Adrianne OREILLY     Pain History:  When did you first notice your pain? 3 weeks ago   Have you seen anyone else for your pain? No  How has your pain affected your ability to work? 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 Musculoskeletal problem/pain  Onset/Duration: 3 weeks  Description  Location: neck, back, shoulders, elbows - bilateral  Joint Swelling: YES- elbows bilaterally, nape of neck-also has rash  Redness: YES- elbows  Pain: YES  Warmth: YES- elbows  Intensity:  moderate  Progression of Symptoms:  same and waxing and waning, pain worse at night and in the morning  Accompanying signs and symptoms:   Fevers: No  Numbness/tingling/weakness: YES- middle finger on left hand and thumb and index finger on the right hand tingling/burning intermittently  History  Trauma to the area: No  Recent illness:  No  Previous similar problem: YES  Previous evaluation:  No  Precipitating or alleviating factors:  Aggravating factors include: lifting and overuse  Therapies tried and outcome: rest/inactivity, heat, ice, acetaminophen, and biofreeze      {Links to Pain Management SmartSet and MNPMP (Optional) :028857}  {additonal problems for provider to add (Optional):768465}    {ROS Picklists (Optional):967751}      Objective           Vitals:  No vitals were obtained  "today due to virtual visit.    Physical Exam   {video visit exam brief selected:685463}    {Diagnostic Test Results (Optional):109814}      Video-Visit Details    Type of service:  Video Visit   Originating Location (pt. Location): {video visit patient location:735941::\"Home\"}  {PROVIDER LOCATION On-site should be selected for visits conducted from your clinic location or adjoining [facility] hospital, academic office, or other nearby [facility] building. Off-site should be selected for all other provider locations, including home:797419}  Distant Location (provider location):  {virtual location provider:339342}  Platform used for Video Visit: {Virtual Visit Platforms:141786::\"[employer]\"}  Signed Electronically by: DELANO Lin CNP  {Email feedback regarding this note to [email or web address]   :129439}  "

## 2024-09-17 NOTE — PROGRESS NOTES
Donna is a 44 year old who is being evaluated via a billable telephone visit.    What phone number would you like to be contacted at? [phone]  How would you like to obtain your AVS? Jose  Originating Location (pt. Location): Home    Distant Location (provider location):  On-site    Assessment & Plan     Anterolisthesis of cervical spine    - predniSONE (DELTASONE) 20 MG tablet; Take 3 tabs by mouth daily x 3 days, then 2 tabs daily x 3 days, then 1 tab daily x 3 days, then 1/2 tab daily x 3 days.    DDD (degenerative disc disease), lumbar    - methocarbamol (ROBAXIN) 500 MG tablet; Take 1 tablet (500 mg) by mouth 4 times daily as needed for muscle spasms.    Back muscle spasm    - methocarbamol (ROBAXIN) 500 MG tablet; Take 1 tablet (500 mg) by mouth 4 times daily as needed for muscle spasms.      Moderate persistent asthma with exacerbation  Controlled, continue current meds;  - albuterol (PROAIR HFA/PROVENTIL HFA/VENTOLIN HFA) 108 (90 Base) MCG/ACT inhaler; INHALE 2 PUFFS INTO THE LUNGS EVERY 4 HOURS AS NEEDED FOR SHORTNESS OF BREATH OR DIFFICULT BREATHING  - albuterol (PROVENTIL) (2.5 MG/3ML) 0.083% neb solution; Take 1 vial (2.5 mg) by nebulization every 4 hours as needed for shortness of breath or wheezing.  - montelukast (SINGULAIR) 10 MG tablet; Take 1 tablet (10 mg) by mouth at bedtime.    Bipolar affective disorder, remission status unspecified (H)  Stable, continue;  - buPROPion (WELLBUTRIN XL) 300 MG 24 hr tablet; Take 1 tablet (300 mg) by mouth every morning.    Gastroesophageal reflux disease, unspecified whether esophagitis present    - omeprazole (PRILOSEC) 40 MG DR capsule; Take 1 capsule (40 mg) by mouth daily.        FUTURE APPOINTMENTS:       - Follow up in 1 week for persistent symptoms, sooner for new or worsening symptoms.       Subjective   Donna is a 44 year old, presenting for the following health issues:  Musculoskeletal Problem      9/17/2024     3:54 PM   Additional Questions    Roomed by Adrianne HO     Musculoskeletal Problem    History of Present Illness       Reason for visit:  Acute flair up of pain    She eats 0-1 servings of fruits and vegetables daily.She consumes 0 sweetened beverage(s) daily.She exercises with enough effort to increase her heart rate 9 or less minutes per day.  She exercises with enough effort to increase her heart rate 3 or less days per week.   She is taking medications regularly.       Pain History:  When did you first notice your pain? 3 weeks ago   Have you seen anyone else for your pain? No  How has your pain affected your ability to work? 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 Musculoskeletal problem/pain  Onset/Duration: 3 weeks  Description  Location: Neck, back, shoulders, elbows - bilateral  Joint Swelling: YES- elbows bilaterally  Redness: YES- elbows and nape of neck  Pain: YES  Warmth: YES- elbows  Intensity:  moderate  Progression of Symptoms:  same and waxing and waning; worse at night and in the morning  Accompanying signs and symptoms:   Fevers: No  Numbness/tingling/weakness: YES- in some fingers on each hand   History  Trauma to the area: No  Recent illness:  No  Previous similar problem: YES  Previous evaluation:  No  Precipitating or alleviating factors:  Aggravating factors include: lifting and overuse  Therapies tried and outcome: rest/inactivity, heat, ice, acetaminophen, and biofreeze    Hx of chronic pain and undiagnosed connective tissue disorder. Reports flares with stress and she is currently in grad school and under  a lot of stress. Has used Prednisone in the past with complete relief.     Depression and Anxiety   How are you doing with your depression since your last visit? No change  How are you doing with your anxiety since your last visit?  No change  Are you having other symptoms that might be associated with depression or  anxiety? No  Have you had a significant life event? No   Do you have any concerns with your use of alcohol or other drugs? No    Social History     Tobacco Use    Smoking status: Former     Current packs/day: 0.00     Average packs/day: 0.5 packs/day for 15.0 years (7.5 ttl pk-yrs)     Types: Cigarettes     Start date: 7/7/1999     Quit date: 7/7/2014     Years since quitting: 10.2    Smokeless tobacco: Never    Tobacco comments:     1/4 pack daily   Vaping Use    Vaping status: Never Used   Substance Use Topics    Alcohol use: No    Drug use: No         1/24/2020     9:39 AM 3/15/2021     1:38 PM 8/6/2021     9:14 AM   PHQ   PHQ-9 Total Score 2 3 5   Q9: Thoughts of better off dead/self-harm past 2 weeks Not at all Not at all Not at all         1/24/2020     9:39 AM 3/15/2021     1:38 PM 8/6/2021     9:14 AM   YO-7 SCORE   Total Score 9 4 5         Suicide Assessment Five-step Evaluation and Treatment (SAFE-T)    Asthma Follow-Up    Was ACT completed today?  Yes        9/17/2024     4:19 PM   ACT Total Scores   ACT TOTAL SCORE (Goal Greater than or Equal to 20) 20   In the past 12 months, how many times did you visit the emergency room for your asthma without being admitted to the hospital? 0   In the past 12 months, how many times were you hospitalized overnight because of your asthma? 0        How many days per week do you miss taking your asthma controller medication?  0  Please describe any recent triggers for your asthma:  allergies  Have you had any Emergency Room Visits, Urgent Care Visits, or Hospital Admissions since your last office visit?  No    Medication Followup of Methocarbamol  Taking Medication as prescribed: yes  Side Effects:  None  Medication Helping Symptoms:  yes         Review of Systems  Constitutional, HEENT, cardiovascular, pulmonary, gi and gu systems are negative, except as otherwise noted.      Objective           Vitals:  No vitals were obtained today due to virtual visit.    Physical  Exam   General: Alert and no distress //Respiratory: No audible wheeze, cough, or shortness of breath // Psychiatric:  Appropriate affect, tone, and pace of words            Phone call duration: 14 minutes  Signed Electronically by: DELANO Lin CNP

## 2025-02-22 ENCOUNTER — HOSPITAL ENCOUNTER (EMERGENCY)
Facility: CLINIC | Age: 45
Discharge: HOME | End: 2025-02-22
Attending: PHYSICIAN ASSISTANT | Admitting: PHYSICIAN ASSISTANT
Payer: COMMERCIAL

## 2025-02-22 VITALS
TEMPERATURE: 98.7 F | OXYGEN SATURATION: 97 % | SYSTOLIC BLOOD PRESSURE: 110 MMHG | DIASTOLIC BLOOD PRESSURE: 62 MMHG | HEART RATE: 74 BPM | RESPIRATION RATE: 18 BRPM

## 2025-02-22 DIAGNOSIS — L30.9 DERMATITIS: ICD-10-CM

## 2025-02-22 DIAGNOSIS — R06.2 WHEEZING: ICD-10-CM

## 2025-02-22 PROCEDURE — 99214 OFFICE O/P EST MOD 30 MIN: CPT | Performed by: PHYSICIAN ASSISTANT

## 2025-02-22 PROCEDURE — G0463 HOSPITAL OUTPT CLINIC VISIT: HCPCS | Performed by: PHYSICIAN ASSISTANT

## 2025-02-22 RX ORDER — TRIAMCINOLONE ACETONIDE 1 MG/G
CREAM TOPICAL 2 TIMES DAILY
Qty: 30 G | Refills: 0 | Status: SHIPPED | OUTPATIENT
Start: 2025-02-22

## 2025-02-22 RX ORDER — PREDNISONE 20 MG/1
20 TABLET ORAL DAILY
Qty: 10 TABLET | Refills: 0 | Status: SHIPPED | OUTPATIENT
Start: 2025-02-22

## 2025-02-22 ASSESSMENT — COLUMBIA-SUICIDE SEVERITY RATING SCALE - C-SSRS
2. HAVE YOU ACTUALLY HAD ANY THOUGHTS OF KILLING YOURSELF IN THE PAST MONTH?: NO
1. IN THE PAST MONTH, HAVE YOU WISHED YOU WERE DEAD OR WISHED YOU COULD GO TO SLEEP AND NOT WAKE UP?: NO
6. HAVE YOU EVER DONE ANYTHING, STARTED TO DO ANYTHING, OR PREPARED TO DO ANYTHING TO END YOUR LIFE?: NO

## 2025-02-22 ASSESSMENT — ACTIVITIES OF DAILY LIVING (ADL): ADLS_ACUITY_SCORE: 41

## 2025-02-24 NOTE — ED PROVIDER NOTES
"  History     Chief Complaint   Patient presents with    Rash     Rash on the nape of head   Patient states she has had severe itching   Onset 1 year ago        HPI  Alma Quinn is a 44 year old female who presents to urgent care requesting prescription for steroid.  Patient reports she has chronic rash on the posterior aspect of her scalp near hairline which is not present for at least the last year and a half.  It is intensely pruritic and has worsened over the last several days with some secondary bleeding due to itching.  She also states history of rheumatologic condition for which she receives occasional oral steroids.  She states that while on oral steroids rash resolves and is wondering if she can receive medication today.  She denies any current fever, chills, myalgias, new onset sore throat, cough, dyspnea, wheezing.  She denies any changes in soaps, detergents, lotions, foods, medications, insect bites or stings or plant exposures    Allergies:  Allergies   Allergen Reactions    Banana Extract Allergy Skin Test Other (See Comments) and Rash     Kiwi, avocado    Adhesive Tape Other (See Comments)     Open sores.    Kiwi Other (See Comments)    Melon Itching    Nsaids Other (See Comments)     H/o anne-n-y gastric bypass\". AVOID NSAIDs and aspirin due to risk of gastric and/or G-J anastomotic ulcers. If Alma must be on short course of NSAIDs or aspirin, use enteric coated if possible and use PPI // Selina Lazar RN, Bariatric Nurse Clinician, [facility] 7/8/2022    Nuts Hives    Shellfish Allergy     Avocado Rash    Banana Rash     Kiwi, avocado    Latex Rash    Pcn [Penicillins] Rash    Plaquenil [Hydroxychloroquine] Rash    Sulfanilamide Rash     Problem List:    Patient Active Problem List    Diagnosis Date Noted    Bipolar 1 disorder (H) 04/13/2011     Priority: High     Sees psychiatric nurse, Margaux Azul NP with [facility]      Moderate " persistent asthma without complication 04/05/2024     Priority: Medium    Lateral epicondylitis 07/17/2023     Priority: Medium    Left arm pain 07/17/2023     Priority: Medium    Numbness and tingling in left hand 07/17/2023     Priority: Medium    S/P gastric bypass 07/07/2022     Priority: Medium     Formatting of this note might be different from the original.   Dr. Cerna      Hiatal hernia 07/07/2022     Priority: Medium    Encinas's esophagus determined by endoscopy 02/01/2022     Priority: Medium    Morbid obesity (H) 01/24/2020     Priority: Medium    Screening for tuberculosis 02/14/2019     Priority: Medium    Urticaria, unspecified 05/10/2013     Priority: Medium    GERD (gastroesophageal reflux disease) 10/19/2012     Priority: Medium    S/P gastrectomy 07/11/2012     Priority: Medium     Overview:   Lap Sleeve Gastrectomy by Dr. Ephraim Blood from bariatric surgery. DOS 7/11/2012 at [facility], [place], [place]      Lumbar radiculopathy 11/04/2011     Priority: Medium     Acute bilateral leg weakness 2/11 and found to have L5-S1 herniated disk with compression of left L5 nerve root.    S/p left L5-S1 hemilaminectomy and microdiscectomy 2/24/11.  Continues to have residual left leg weakness and pain      Weakness of left leg 11/04/2011     Priority: Medium    Fibromyalgia 05/23/2011     Priority: Medium    DDD (degenerative disc disease), lumbar 04/13/2011     Priority: Medium     MRI 2/11- L5-S1 large central posterior extruded disc herniation extending inferior to the disc level, causing slight impression on  thecal sac and left S1 nerve root sleeve. S/p Left L5-S1 hemilaminectomy discectomy.       SAMEER (obstructive sleep apnea) 06/17/2010     Priority: Medium     Not tolerating CPAP, has deviated septum - referred to ENT in 12/09 and prescribed flonase      Seasonal allergic rhinitis 06/17/2010     Priority: Medium    Intermittent asthma 06/17/2010     Priority: Medium     Uses inhaled steroid in  fall, typically      Chronic pain disorder 06/17/2010     Priority: Medium     Started in 2008 3 months postpartum.  Pain in her entire body with muscle cramping- arms, legs, elbows, knees, ankles, hips, shoulders, fingers etc.  Has been on multiple meds icluding gabapentin, cymbalta, NSAIDs, flexeril and narcotics with minimal benefit.  High doses of prednisone helped some.  Insurance would not cover lyrica.  Mild elevation of ESR to ~30 and CRP to ~24.  RF was 14 (normal 0-14).  CCP was negative  MRI 4/09:  Degenerative changes at L5-S1, mod to mild corby foraminal narrowing with mild facet arthropathy, no nerve impingement  Negative ehrlichia and lyme testing in 5/09  Mildly decreased vitamin D level at 20 in 10/08 - corrected to 25 in 1/09, then put on 50,000 units/week  Vitamin D level 5/10 was 16  ? Fibromyalgia vs chronic pain syndrome vs vitamin D deficiency?  Normal hand and foot xrays 7/09  Seen by Dr. Gildardo Villeda, rheumatology 6/10 - lab w/u and exam negative for RA.  Vitamin D levels low - recommended vitamin D3 4000 units/day x 8 weeks, then 2000 units/day.  Also referral to PT for iliotibial band syndrome and corby knee pain.  Also referral to rheumatology nursing associates for fibromyalgia.  Also emphasized need to treat SAMEER and possible bariatric surgery.      PCOS (polycystic ovarian syndrome) 06/17/2010     Priority: Medium    Generalized osteoarthrosis, involving multiple sites 05/07/2010     Priority: Medium    Bipolar I disorder (H) 05/07/2010     Priority: Medium    Vitamin D deficiency 05/07/2010     Priority: Medium    Sciatica 04/13/2010     Priority: Medium    Status post bariatric surgery 02/04/2009     Priority: Medium    Posttraumatic stress disorder 09/16/2008     Priority: Medium    Hemorrhage in early pregnancy, antepartum 05/08/2006     Priority: Medium    Female infertility 10/19/2005     Priority: Medium    CARDIOVASCULAR SCREENING; LDL GOAL LESS THAN 160 10/31/2010     Priority:  Low        Past Medical History:    Past Medical History:   Diagnosis Date    Cystic kidney disease     Depression     PONV (postoperative nausea and vomiting)     Post traumatic stress disorder     Seasonal allergies     Toxemia     Uncomplicated asthma      Past Surgical History:    Past Surgical History:   Procedure Laterality Date    ADENOIDECTOMY      APPENDECTOMY      ARTHROSCOPY KNEE WITH MEDIAL MENISCECTOMY  2014    Procedure: ARTHROSCOPY KNEE WITH MEDIAL MENISCECTOMY;  Surgeon: Arron Curtis MD;  Location: [facility] OR    ARTHROSCOPY KNEE WITH MEDIAL MENISCECTOMY Left 2015    Procedure: ARTHROSCOPY KNEE WITH MEDIAL MENISCECTOMY;  Surgeon: Arron Curtis MD;  Location: [facility] OR    C/SECTION, CLASSICAL      , Classical    CHOLECYSTECTOMY, LAPOROSCOPIC  10/07    Cholecystectomy, Laparoscopic    HEMILAMINECTOMY, DISCECTOMY LUMBAR TWO LEVELS, COMBINED  11    Left L5-S1 hemilaminectomy for discectomy     LAPAROSCOPIC GASTRIC SLEEVE  12    lap sleeve gastrectomy    SURGICAL HISTORY OF 1980    Kidney Surgery    SURGICAL HISTORY OF -       Urethral implants    TONSILLECTOMY       Family History:    Family History   Problem Relation Age of Onset    Gastrointestinal Disease Mother         chrohns    Diabetes Father     Cancer Father         had cancer  between lining of lungs    Cardiovascular Father 57        4 valves replaced    Lupus Father     Breast Cancer Maternal Grandmother     Respiratory Maternal Grandfather         COPD    Diabetes Paternal Grandmother     C.A.D. Paternal Grandmother     Asthma Paternal Grandmother     Diabetes Paternal Grandfather     Prostate Cancer Paternal Grandfather     Asthma Son     Asthma Daughter      Social History:  Marital Status:   [2]  Social History     Tobacco Use    Smoking status: Former     Current packs/day: 0.00     Average packs/day: 0.5 packs/day for 15.0 years (7.5 ttl pk-yrs)     Types: Cigarettes     Start date:  7/7/1999     Quit date: 7/7/2014     Years since quitting: 10.6    Smokeless tobacco: Never    Tobacco comments:     1/4 pack daily   Vaping Use    Vaping status: Never Used   Substance Use Topics    Alcohol use: No    Drug use: No      Medications:    predniSONE (DELTASONE) 20 MG tablet  triamcinolone (KENALOG) 0.1 % external cream  Acetaminophen (TYLENOL DISSOLVE PACKS) 500 MG PACK  albuterol (PROAIR HFA/PROVENTIL HFA/VENTOLIN HFA) 108 (90 Base) MCG/ACT inhaler  albuterol (PROVENTIL) (2.5 MG/3ML) 0.083% neb solution  buPROPion (WELLBUTRIN XL) 300 MG 24 hr tablet  Calcium-Magnesium-Zinc 333-133-5 MG TABS per tablet  Cholecalciferol (VITAMIN D3) 81391 UNIT CAPS  colchicine (COLCRYS) 0.6 MG tablet  cyanocobalamin (VITAMIN B-12) 1000 MCG sublingual tablet  EPINEPHrine (ANY BX GENERIC EQUIV) 0.3 MG/0.3ML injection 2-pack  fluticasone-salmeterol (ADVAIR) 500-50 MCG/ACT inhaler  hydrOXYzine (VISTARIL) 50 MG capsule  levonorgestrel (MIRENA) 20 MCG/DAY IUD  melatonin 5 MG tablet  methocarbamol (ROBAXIN) 500 MG tablet  montelukast (SINGULAIR) 10 MG tablet  multivitamin w/minerals (THERA-VIT-M) tablet  omeprazole (PRILOSEC) 40 MG DR capsule  ondansetron (ZOFRAN) 4 MG tablet  ORDER FOR DME  predniSONE (DELTASONE) 20 MG tablet      Review of Systems  Per HPI   Physical Exam   BP: 110/62  Pulse: 74  Temp: 98.7  F (37.1  C)  Resp: 18  SpO2: 97 %  Physical Exam  GENERAL APPEARANCE: healthy, alert and no distress  EYES: EOMI,  PERRL, conjunctiva clear  HENT: ear canals and TM's normal.  Nose and mouth without ulcers, erythema or lesions  NECK: supple, nontender, no lymphadenopathy  RESP: end expiratory wheezing, no crackles   CV: regular rates and rhythm, normal S1 S2, no murmur noted  SKIN: dry rough erythematous plaques to posterior scalp/neck along hairline   ED Course        Procedures       Critical Care time:  none  None     No results found for this or any previous visit (from the past 24 hours).  Medications - No data to  display    Assessments & Plan (with Medical Decision Making)     I have reviewed the nursing notes.    I have reviewed the findings, diagnosis, plan and need for follow up with the patient.       Discharge Medication List as of 2/22/2025 10:19 AM        START taking these medications    Details   !! predniSONE (DELTASONE) 20 MG tablet Take 1 tablet (20 mg) by mouth daily., Disp-10 tablet, R-0, E-Prescribe      triamcinolone (KENALOG) 0.1 % external cream Apply topically 2 times daily.Disp-30 g, [id number]       !! - Potential duplicate medications found. Please discuss with provider.        Final diagnoses:   Dermatitis   Wheezing     44-year-old female presents urgent care with concern over rash that is not present on her scalp for more than 1 year.  She had stable vital signs upon arrival.  Physical exam findings significant for some dry rough erythematous plaques to the posterior scalp, bilateral end expiratory wheezing likely secondary to her known history of asthma.  Given wheezing I did agree to provide burst of oral prednisone.  He was instructed to treat rash with topical steroid differential for rash would include eczema, contact dermatitis, psoriasis, seborrheic dermatis  I do not suspect tinea capitis.  She was discharged home with instructions to follow-up with primary care provider or dermatology for long-term management of chronic condition.  Worrisome reasons to return to the ER/UC sooner discussed.    Disclaimer: This note consists of symbols derived from keyboarding, dictation, and/or voice recognition software. As a result, there may be errors in the script that have gone undetected.  Please consider this when interpreting information found in the chart.    2/22/2025   [facility] EMERGENCY DEPT       Ally Lam PA-C  02/24/25 1246

## 2025-04-07 ENCOUNTER — E-VISIT (OUTPATIENT)
Dept: FAMILY MEDICINE | Facility: CLINIC | Age: 45
End: 2025-04-07
Payer: COMMERCIAL

## 2025-04-07 DIAGNOSIS — Z11.1 TUBERCULOSIS SCREENING: Primary | ICD-10-CM

## 2025-04-07 PROCEDURE — 99207 PR NON-BILLABLE SERV PER CHARTING: CPT | Performed by: NURSE PRACTITIONER

## 2025-04-07 NOTE — PATIENT INSTRUCTIONS
Thank you for choosing us for your care. I have placed the below lab(s) for you:  Orders Placed This Encounter   Procedures     Quantiferon-TB Gold Plus          To schedule your lab appointment, please click the Schedule button in your [email or web address] Home Page.

## 2025-04-10 ENCOUNTER — LAB (OUTPATIENT)
Dept: LAB | Facility: CLINIC | Age: 45
End: 2025-04-10
Payer: COMMERCIAL

## 2025-04-10 DIAGNOSIS — Z11.1 TUBERCULOSIS SCREENING: ICD-10-CM

## 2025-04-19 ENCOUNTER — HOSPITAL ENCOUNTER (OUTPATIENT)
Dept: MAMMOGRAPHY | Facility: CLINIC | Age: 45
Discharge: HOME | End: 2025-04-19
Attending: NURSE PRACTITIONER | Admitting: NURSE PRACTITIONER
Payer: COMMERCIAL

## 2025-04-19 DIAGNOSIS — Z12.31 VISIT FOR SCREENING MAMMOGRAM: ICD-10-CM

## 2025-04-19 PROCEDURE — 77063 BREAST TOMOSYNTHESIS BI: CPT

## 2025-04-29 ENCOUNTER — VIRTUAL VISIT (OUTPATIENT)
Dept: FAMILY MEDICINE | Facility: CLINIC | Age: 45
End: 2025-04-29
Payer: COMMERCIAL

## 2025-04-29 ENCOUNTER — MYC MEDICAL ADVICE (OUTPATIENT)
Dept: FAMILY MEDICINE | Facility: CLINIC | Age: 45
End: 2025-04-29

## 2025-04-29 DIAGNOSIS — J01.90 ACUTE SINUSITIS WITH SYMPTOMS > 10 DAYS: ICD-10-CM

## 2025-04-29 DIAGNOSIS — J45.41 MODERATE PERSISTENT ASTHMA WITH EXACERBATION: Primary | ICD-10-CM

## 2025-04-29 DIAGNOSIS — J30.2 SEASONAL ALLERGIC RHINITIS, UNSPECIFIED TRIGGER: ICD-10-CM

## 2025-04-29 PROCEDURE — 98005 SYNCH AUDIO-VIDEO EST LOW 20: CPT | Performed by: NURSE PRACTITIONER

## 2025-04-29 RX ORDER — PREDNISONE 20 MG/1
TABLET ORAL
Qty: 20 TABLET | Refills: 0 | Status: SHIPPED | OUTPATIENT
Start: 2025-04-29

## 2025-04-29 RX ORDER — ALBUTEROL SULFATE 0.83 MG/ML
2.5 SOLUTION RESPIRATORY (INHALATION) EVERY 4 HOURS PRN
Qty: 300 ML | Refills: 3 | Status: SHIPPED | OUTPATIENT
Start: 2025-04-29

## 2025-04-29 RX ORDER — DOXYCYCLINE 100 MG/1
100 CAPSULE ORAL 2 TIMES DAILY
Qty: 20 CAPSULE | Refills: 0 | Status: SHIPPED | OUTPATIENT
Start: 2025-04-29 | End: 2025-05-09

## 2025-04-29 RX ORDER — CETIRIZINE HYDROCHLORIDE 10 MG/1
10 TABLET ORAL DAILY
Qty: 90 TABLET | Refills: 3 | Status: SHIPPED | OUTPATIENT
Start: 2025-04-29

## 2025-04-29 RX ORDER — BUDESONIDE 0.25 MG/2ML
0.25 INHALANT ORAL DAILY
Qty: 60 ML | Refills: 12 | Status: SHIPPED | OUTPATIENT
Start: 2025-04-29

## 2025-04-29 NOTE — PROGRESS NOTES
Donna is a 44 year old who is being evaluated via a billable video visit.    How would you like to obtain your AVS? MyChart  If the video visit is dropped, the invitation should be resent by: Text to cell phone: [phone]  Will anyone else be joining your video visit? No      Assessment & Plan     Moderate persistent asthma with exacerbation  Refills, start taper  - budesonide (PULMICORT) 0.25 MG/2ML neb solution; Take 2 mLs (0.25 mg) by nebulization daily. For asthma  - albuterol (PROVENTIL) (2.5 MG/3ML) 0.083% neb solution; Take 1 vial (2.5 mg) by nebulization every 4 hours as needed for shortness of breath or wheezing.  - predniSONE (DELTASONE) 20 MG tablet; Take 3 tabs by mouth daily x 3 days, then 2 tabs daily x 3 days, then 1 tab daily x 3 days, then 1/2 tab daily x 3 days.    Acute sinusitis with symptoms > 10 days    - doxycycline hyclate (VIBRAMYCIN) 100 MG capsule; Take 1 capsule (100 mg) by mouth 2 times daily for 10 days.    Seasonal allergic rhinitis, unspecified trigger    - cetirizine (ZYRTEC) 10 MG tablet; Take 1 tablet (10 mg) by mouth daily.        Follow up in 3 days for symptoms that are not improving, sooner for new or worsening sx.           Subjective   Donna is a 44 year old, presenting for the following health issues:  URI      4/29/2025     8:25 AM   Additional Questions   Roomed by Daisy OREILLY      Acute Illness  Acute illness concerns: URI  Onset/Duration: 1.5 weeks   Symptoms:  Fever: YES- 101.3  Chills/Sweats: No  Headache (location?): YES  Sinus Pressure: YES  Conjunctivitis:  No  Ear Pain: YES: bilateral, right is worse   Rhinorrhea: No  Congestion: YES- nasal, PND, chest   Sore Throat: YES- last week   Cough: YES-non-productive, with shortness of breath  Wheeze: YES  Decreased Appetite: YES  Nausea: No  Vomiting: No  Diarrhea: No  Dysuria/Freq.: No  Dysuria or Hematuria: No  Fatigue/Achiness: YES  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 0    In the past 12 months, how many times were you hospitalized overnight because of your asthma? 0        Proxy-reported     Do you have any of the following symptoms? Cough and Trouble with breathing (shortness of breath)  What makes your asthma/breathing worse?  Upper respiratory illness and Pollens  Do you want more information about how to use your inhaler? No      Review of Systems  Constitutional, HEENT, cardiovascular, pulmonary, gi and gu systems are negative, except as otherwise noted.      Objective           Vitals:  No vitals were obtained today due to virtual visit.    Physical Exam   GENERAL: alert and no distress  EYES: Eyes grossly normal to inspection.  No discharge or erythema, or obvious scleral/conjunctival abnormalities.  RESP: No audible wheeze, cough, or visible cyanosis.    SKIN: Visible skin clear. No significant rash, abnormal pigmentation or lesions.  NEURO: Cranial nerves grossly intact.  Mentation and speech appropriate for age.  PSYCH: Appropriate affect, tone, and pace of words          Video-Visit Details    Type of service:  Video Visit   Originating Location (pt. Location): Home    Distant Location (provider location):  On-site  Platform used for Video Visit: Swati  Signed Electronically by: DELANO Lin CNP

## 2025-05-06 DIAGNOSIS — J45.40 MODERATE PERSISTENT ASTHMA WITHOUT COMPLICATION: ICD-10-CM

## 2025-05-06 RX ORDER — FLUTICASONE PROPIONATE AND SALMETEROL 500; 50 UG/1; UG/1
1 POWDER RESPIRATORY (INHALATION) EVERY 12 HOURS
Qty: 60 EACH | Refills: 11 | Status: SHIPPED | OUTPATIENT
Start: 2025-05-06

## 2025-05-06 NOTE — TELEPHONE ENCOUNTER
Requested Prescriptions   Pending Prescriptions Disp Refills    WIXELA INHUB 500-50 MCG/ACT inhaler [Pharmacy Med Name: WIXELA INHUB 500-50MCG/ACT AEPB]  11     Sig: INHALE 1 PUFF INTO THE LUNGS EVERY 12 HOURS       Inhaled Steroids Protocol Failed - 5/6/2025 12:49 PM        Failed - Asthma control assessment score within normal limits in last 6 months     Please review ACT score.           Passed - Patient is age 12 or older        Passed - Medication is active on med list and the sig matches. RN to manually verify dose and sig if red X/fail.     If the protocol passes (green check), you do not need to verify med dose and sig.    A prescription matches if they are the same clinical intention.    For Example: once daily and every morning are the same.    The protocol can not identify upper and lower case letters as matching and will fail.     For Example: Take 1 tablet (50 mg) by mouth daily     TAKE 1 TABLET (50 MG) BY MOUTH DAILY    For all fails (red x), verify dose and sig.    If the refill does match what is on file, the RN can still proceed to approve the refill request.       If they do not match, route to the appropriate provider.             Passed - Recent (6 mo) or future (90 days) visit within the authorizing provider's specialty     The patient must have completed an in-person or virtual visit within the past 6 months or has a future visit scheduled within the next 90 days with the authorizing provider s specialty.  Urgent care and e-visits do not quality as an office visit for this protocol.          Passed - Medication indicated for associated diagnosis     Medication is associated with one or more of the following diagnoses:    Allergies   Asthma   COPD   Nasal Congestion   Nasal Polyps   Rhinitis   Cystic Fibrosis   Bronchiectasis

## 2025-05-10 ENCOUNTER — HEALTH MAINTENANCE LETTER (OUTPATIENT)
Age: 45
End: 2025-05-10

## 2025-06-09 DIAGNOSIS — Z86.39 HISTORY OF VITAMIN D DEFICIENCY: ICD-10-CM

## 2025-06-09 DIAGNOSIS — Z86.2 HISTORY OF IRON DEFICIENCY ANEMIA: ICD-10-CM

## 2025-06-09 DIAGNOSIS — Z13.21 ENCOUNTER FOR VITAMIN DEFICIENCY SCREENING: ICD-10-CM

## 2025-06-09 DIAGNOSIS — F32.9 MAJOR DEPRESSIVE DISORDER: ICD-10-CM

## 2025-06-09 DIAGNOSIS — F41.1 GENERALIZED ANXIETY DISORDER: Primary | ICD-10-CM

## 2025-06-09 DIAGNOSIS — R41.840 INATTENTION: ICD-10-CM

## 2025-06-17 ENCOUNTER — OFFICE VISIT (OUTPATIENT)
Dept: FAMILY MEDICINE | Facility: CLINIC | Age: 45
End: 2025-06-17
Payer: COMMERCIAL

## 2025-06-17 ENCOUNTER — ANCILLARY PROCEDURE (OUTPATIENT)
Dept: GENERAL RADIOLOGY | Facility: CLINIC | Age: 45
End: 2025-06-17
Attending: FAMILY MEDICINE
Payer: COMMERCIAL

## 2025-06-17 VITALS
TEMPERATURE: 98 F | HEIGHT: 65 IN | BODY MASS INDEX: 39.39 KG/M2 | RESPIRATION RATE: 20 BRPM | SYSTOLIC BLOOD PRESSURE: 120 MMHG | OXYGEN SATURATION: 97 % | WEIGHT: 236.4 LBS | DIASTOLIC BLOOD PRESSURE: 82 MMHG | HEART RATE: 80 BPM

## 2025-06-17 DIAGNOSIS — J45.41 MODERATE PERSISTENT ASTHMA WITH ACUTE EXACERBATION: Primary | ICD-10-CM

## 2025-06-17 DIAGNOSIS — J45.41 MODERATE PERSISTENT ASTHMA WITH ACUTE EXACERBATION: ICD-10-CM

## 2025-06-17 PROCEDURE — 71046 X-RAY EXAM CHEST 2 VIEWS: CPT | Mod: TC | Performed by: RADIOLOGY

## 2025-06-17 PROCEDURE — 99214 OFFICE O/P EST MOD 30 MIN: CPT | Performed by: FAMILY MEDICINE

## 2025-06-17 RX ORDER — AZITHROMYCIN 250 MG/1
TABLET, FILM COATED ORAL
Qty: 6 TABLET | Refills: 0 | Status: SHIPPED | OUTPATIENT
Start: 2025-06-17 | End: 2025-06-22

## 2025-06-17 RX ORDER — PREDNISONE 20 MG/1
40 TABLET ORAL DAILY
Qty: 14 TABLET | Refills: 0 | Status: SHIPPED | OUTPATIENT
Start: 2025-06-17 | End: 2025-06-24

## 2025-06-17 ASSESSMENT — ASTHMA QUESTIONNAIRES
QUESTION_2 LAST FOUR WEEKS HOW OFTEN HAVE YOU HAD SHORTNESS OF BREATH: THREE TO SIX TIMES A WEEK
QUESTION_1 LAST FOUR WEEKS HOW MUCH OF THE TIME DID YOUR ASTHMA KEEP YOU FROM GETTING AS MUCH DONE AT WORK, SCHOOL OR AT HOME: SOME OF THE TIME
QUESTION_5 LAST FOUR WEEKS HOW WOULD YOU RATE YOUR ASTHMA CONTROL: POORLY CONTROLLED
ACT_TOTALSCORE: 14
QUESTION_3 LAST FOUR WEEKS HOW OFTEN DID YOUR ASTHMA SYMPTOMS (WHEEZING, COUGHING, SHORTNESS OF BREATH, CHEST TIGHTNESS OR PAIN) WAKE YOU UP AT NIGHT OR EARLIER THAN USUAL IN THE MORNING: ONCE OR TWICE
QUESTION_4 LAST FOUR WEEKS HOW OFTEN HAVE YOU USED YOUR RESCUE INHALER OR NEBULIZER MEDICATION (SUCH AS ALBUTEROL): ONE OR TWO TIMES PER DAY

## 2025-06-17 ASSESSMENT — PATIENT HEALTH QUESTIONNAIRE - PHQ9
SUM OF ALL RESPONSES TO PHQ QUESTIONS 1-9: 6
SUM OF ALL RESPONSES TO PHQ QUESTIONS 1-9: 6
10. IF YOU CHECKED OFF ANY PROBLEMS, HOW DIFFICULT HAVE THESE PROBLEMS MADE IT FOR YOU TO DO YOUR WORK, TAKE CARE OF THINGS AT HOME, OR GET ALONG WITH OTHER PEOPLE: SOMEWHAT DIFFICULT

## 2025-06-17 NOTE — PROGRESS NOTES
Assessment & Plan     Moderate persistent asthma with acute exacerbation  Differential discussed in detail and suspect symptoms secondary to asthma exacerbation with lower respiratory tract infection.  Azithromycin, prednisone prescribed.  Chest x-ray findings reviewed independently which came back unremarkable.  Recommended to schedule appointment with allergist as well, referral placed.  Patient understood and in agreement with above plan.  All questions answered.  - predniSONE (DELTASONE) 20 MG tablet; Take 2 tablets (40 mg) by mouth daily for 7 days.  - XR Chest 2 Views; Future  - Adult Allergy/Asthma  Referral; Future      Subjective   Donna is a 45 year old, presenting for the following health issues:  Asthma and Allergies        6/17/2025     1:24 PM   Additional Questions   Roomed by Phyllis MAR CMA     History of Present Illness       Reason for visit:  Asthma and Allergies    She eats 4 or more servings of fruits and vegetables daily.She consumes 1 sweetened beverage(s) daily.She exercises with enough effort to increase her heart rate 30 to 60 minutes per day.  She exercises with enough effort to increase her heart rate 5 days per week.   She is taking medications regularly.            6/17/2025   Asthma   1.  In the past 4 weeks, how much of the time did your asthma keep you from getting as much done at work, school or at home? 3    2.  During the past 4 weeks, how often have you had shortness of breath? 3    3.  During the past 4 weeks, how often did your asthma symptoms (wheezing, coughing, shortness of breath, chest tightness or pain) wake you up at night or earlier than usual in the morning? 4    4.  During the past 4 weeks, how often have you used your rescue inhaler or nebulizer medication (such as albuterol)? 2    5.  How would you rate your asthma control during the past 4 weeks? 2    ACT TOTAL SCORE (Goal Greater than or Equal to 20) 14    In the past 12 months, how many times did you  "visit the emergency room for your asthma without being admitted to the hospital? 0    In the past 12 months, how many times were you hospitalized overnight because of your asthma? 0    Do you have a cough, wheezing or shortness of breath? (!) COUGH    (!) NOISY BREATHING (WHEEZING)    (!) TROUBLE WITH BREATHING (SHORTNESS OF BREATH)   What makes your asthma/breathing worse? Smoke    Dust mites    Pollens    Animal dander    Humidity    Strong odors and fumes    Exercise or sports   Do you want more information about how to use your inhaler? No       Proxy-reported    Multiple values from one day are sorted in reverse-chronological order         Review of Systems  Constitutional, neuro, ENT, endocrine, pulmonary, cardiac, gastrointestinal, genitourinary, musculoskeletal, integument and psychiatric systems are negative, except as otherwise noted.      Objective    /82   Pulse 80   Temp 98  F (36.7  C) (Tympanic)   Resp 20   Ht 1.651 m (5' 5\")   Wt 107.2 kg (236 lb 6.4 oz)   SpO2 97%   BMI 39.34 kg/m    Body mass index is 39.34 kg/m .  Physical Exam   GENERAL: alert and no distress  EYES: Eyes grossly normal to inspection, PERRL and conjunctivae and sclerae normal  HENT: normal cephalic/atraumatic, nose and mouth without ulcers or lesions, oropharynx clear, and oral mucous membranes moist  NECK: no adenopathy, no asymmetry, masses, or scars  RESP: lungs clear to auscultation - no rales, rhonchi or wheezes  CV: regular rate and rhythm, normal S1 S2, no S3 or S4, no murmur, click or rub, no peripheral edema  MS: no gross musculoskeletal defects noted, no edema  SKIN: no suspicious lesions or rashes  NEURO: Normal strength and tone, mentation intact and speech normal  PSYCH: mentation appears normal, affect normal/bright        Signed Electronically by: Madan Mortensen MD    "

## 2025-06-18 ENCOUNTER — RESULTS FOLLOW-UP (OUTPATIENT)
Dept: FAMILY MEDICINE | Facility: CLINIC | Age: 45
End: 2025-06-18

## 2025-07-24 ENCOUNTER — MYC MEDICAL ADVICE (OUTPATIENT)
Dept: FAMILY MEDICINE | Facility: CLINIC | Age: 45
End: 2025-07-24
Payer: COMMERCIAL

## 2025-07-24 ASSESSMENT — ASTHMA QUESTIONNAIRES
QUESTION_5 LAST FOUR WEEKS HOW WOULD YOU RATE YOUR ASTHMA CONTROL: WELL CONTROLLED
ACT_TOTALSCORE: 20
QUESTION_4 LAST FOUR WEEKS HOW OFTEN HAVE YOU USED YOUR RESCUE INHALER OR NEBULIZER MEDICATION (SUCH AS ALBUTEROL): ONCE A WEEK OR LESS
QUESTION_2 LAST FOUR WEEKS HOW OFTEN HAVE YOU HAD SHORTNESS OF BREATH: ONCE OR TWICE A WEEK
QUESTION_3 LAST FOUR WEEKS HOW OFTEN DID YOUR ASTHMA SYMPTOMS (WHEEZING, COUGHING, SHORTNESS OF BREATH, CHEST TIGHTNESS OR PAIN) WAKE YOU UP AT NIGHT OR EARLIER THAN USUAL IN THE MORNING: ONCE OR TWICE
QUESTION_1 LAST FOUR WEEKS HOW MUCH OF THE TIME DID YOUR ASTHMA KEEP YOU FROM GETTING AS MUCH DONE AT WORK, SCHOOL OR AT HOME: A LITTLE OF THE TIME

## 2025-07-24 NOTE — TELEPHONE ENCOUNTER
Patient Quality Outreach    Patient is due for the following:   Asthma  -  ACT needed    Action(s) Taken:   Patient was assigned appropriate questionnaire to complete    Type of outreach:    Sent [facility] message.    Questions for provider review:    None         Tomeka Mcclain MA  Chart routed to None.

## 2025-08-11 ENCOUNTER — TELEPHONE (OUTPATIENT)
Dept: FAMILY MEDICINE | Facility: CLINIC | Age: 45
End: 2025-08-11
Payer: COMMERCIAL